# Patient Record
Sex: MALE | Race: WHITE | NOT HISPANIC OR LATINO | Employment: OTHER | RURAL
[De-identification: names, ages, dates, MRNs, and addresses within clinical notes are randomized per-mention and may not be internally consistent; named-entity substitution may affect disease eponyms.]

---

## 2022-04-19 ENCOUNTER — OFFICE VISIT (OUTPATIENT)
Dept: FAMILY MEDICINE CLINIC | Facility: CLINIC | Age: 67
End: 2022-04-19

## 2022-04-19 VITALS
HEIGHT: 66 IN | OXYGEN SATURATION: 96 % | SYSTOLIC BLOOD PRESSURE: 130 MMHG | BODY MASS INDEX: 32.78 KG/M2 | WEIGHT: 204 LBS | HEART RATE: 90 BPM | DIASTOLIC BLOOD PRESSURE: 84 MMHG

## 2022-04-19 DIAGNOSIS — M20.22 HALLUX RIGIDUS OF BOTH FEET: ICD-10-CM

## 2022-04-19 DIAGNOSIS — Z85.46 HISTORY OF PROSTATE CANCER: ICD-10-CM

## 2022-04-19 DIAGNOSIS — M19.041 PRIMARY OSTEOARTHRITIS OF HANDS, BILATERAL: ICD-10-CM

## 2022-04-19 DIAGNOSIS — R80.9 TYPE 2 DIABETES MELLITUS WITH MICROALBUMINURIA, WITHOUT LONG-TERM CURRENT USE OF INSULIN: ICD-10-CM

## 2022-04-19 DIAGNOSIS — F12.90 MARIJUANA USE, CONTINUOUS: ICD-10-CM

## 2022-04-19 DIAGNOSIS — Z11.59 ENCOUNTER FOR HEPATITIS C SCREENING TEST FOR LOW RISK PATIENT: ICD-10-CM

## 2022-04-19 DIAGNOSIS — M51.36 LUMBAR DEGENERATIVE DISC DISEASE: ICD-10-CM

## 2022-04-19 DIAGNOSIS — M19.042 PRIMARY OSTEOARTHRITIS OF HANDS, BILATERAL: ICD-10-CM

## 2022-04-19 DIAGNOSIS — Z12.5 PROSTATE CANCER SCREENING: ICD-10-CM

## 2022-04-19 DIAGNOSIS — E11.29 TYPE 2 DIABETES MELLITUS WITH MICROALBUMINURIA, WITHOUT LONG-TERM CURRENT USE OF INSULIN: ICD-10-CM

## 2022-04-19 DIAGNOSIS — I10 ESSENTIAL HYPERTENSION, BENIGN: ICD-10-CM

## 2022-04-19 DIAGNOSIS — E78.2 MIXED HYPERLIPIDEMIA: ICD-10-CM

## 2022-04-19 DIAGNOSIS — Z00.01 ENCOUNTER FOR GENERAL ADULT MEDICAL EXAMINATION WITH ABNORMAL FINDINGS: Primary | ICD-10-CM

## 2022-04-19 DIAGNOSIS — Z79.899 ENCOUNTER FOR LONG-TERM (CURRENT) USE OF OTHER MEDICATIONS: ICD-10-CM

## 2022-04-19 DIAGNOSIS — M20.21 HALLUX RIGIDUS OF BOTH FEET: ICD-10-CM

## 2022-04-19 DIAGNOSIS — Z12.11 COLON CANCER SCREENING: ICD-10-CM

## 2022-04-19 PROCEDURE — 36415 COLL VENOUS BLD VENIPUNCTURE: CPT | Performed by: FAMILY MEDICINE

## 2022-04-19 PROCEDURE — 99397 PER PM REEVAL EST PAT 65+ YR: CPT | Performed by: FAMILY MEDICINE

## 2022-04-19 RX ORDER — MAG HYDROX/ALUMINUM HYD/SIMETH 400-400-40
SUSPENSION, ORAL (FINAL DOSE FORM) ORAL
COMMUNITY

## 2022-04-19 RX ORDER — DULAGLUTIDE 0.75 MG/.5ML
INJECTION, SOLUTION SUBCUTANEOUS
COMMUNITY
End: 2022-04-19 | Stop reason: SDUPTHER

## 2022-04-19 RX ORDER — DULAGLUTIDE 0.75 MG/.5ML
0.5 INJECTION, SOLUTION SUBCUTANEOUS WEEKLY
Qty: 6 ML | Refills: 3 | Status: SHIPPED | OUTPATIENT
Start: 2022-04-19 | End: 2022-09-02 | Stop reason: SDUPTHER

## 2022-04-19 RX ORDER — ROSUVASTATIN CALCIUM 20 MG/1
20 TABLET, COATED ORAL DAILY
COMMUNITY
End: 2022-04-19 | Stop reason: SDUPTHER

## 2022-04-19 RX ORDER — LOSARTAN POTASSIUM 25 MG/1
25 TABLET ORAL DAILY
Qty: 90 TABLET | Refills: 3 | Status: SHIPPED | OUTPATIENT
Start: 2022-04-19 | End: 2022-09-02 | Stop reason: SDUPTHER

## 2022-04-19 RX ORDER — UBIDECARENONE 100 MG
100 CAPSULE ORAL DAILY
COMMUNITY

## 2022-04-19 RX ORDER — SODIUM PHOSPHATE,MONO-DIBASIC 19G-7G/118
ENEMA (ML) RECTAL
COMMUNITY

## 2022-04-19 RX ORDER — ROSUVASTATIN CALCIUM 20 MG/1
20 TABLET, COATED ORAL DAILY
Qty: 90 TABLET | Refills: 3 | Status: SHIPPED | OUTPATIENT
Start: 2022-04-19 | End: 2022-09-02 | Stop reason: SDUPTHER

## 2022-04-19 RX ORDER — PNEUMOCOCCAL VACCINE POLYVALENT 25; 25; 25; 25; 25; 25; 25; 25; 25; 25; 25; 25; 25; 25; 25; 25; 25; 25; 25; 25; 25; 25; 25 UG/.5ML; UG/.5ML; UG/.5ML; UG/.5ML; UG/.5ML; UG/.5ML; UG/.5ML; UG/.5ML; UG/.5ML; UG/.5ML; UG/.5ML; UG/.5ML; UG/.5ML; UG/.5ML; UG/.5ML; UG/.5ML; UG/.5ML; UG/.5ML; UG/.5ML; UG/.5ML; UG/.5ML; UG/.5ML; UG/.5ML
0.5 INJECTION, SOLUTION INTRAMUSCULAR; SUBCUTANEOUS ONCE
Qty: 0.5 ML | Refills: 0 | Status: SHIPPED | OUTPATIENT
Start: 2022-04-19 | End: 2022-04-19

## 2022-04-19 RX ORDER — LOSARTAN POTASSIUM 25 MG/1
25 TABLET ORAL DAILY
COMMUNITY
End: 2022-04-19 | Stop reason: SDUPTHER

## 2022-04-19 NOTE — PROGRESS NOTES
"Chief Complaint  Med Refill (Check up )    Subjective      BETH Mancilla presents to [facility]  History of Present Illness  Patient here for yearly exam and recheck on chronic issues.  He has not had any symptoms or recurrence of prostate cancer.  Patient does not check fingerstick blood sugars at home and refuses to do so.  His A1c has been okay over the last few years but he has decided on his own to only take the Trulicity every other week, and will not take it every week unless his A1c goes up.  He understands this is not the recommended dosing but this is how he wants to do it.  Patient still uses marijuana on a daily basis and has done so since age 14 and has no interest in quitting.  He says he is feeling well other than ongoing pain in the foot due to hallux rigidus, but has put off surgery because his wife's metastatic breast cancer has recurred.    Objective   Vital Signs:   Vitals:    04/19/22 0810   BP: 130/84   BP Location: Left arm   Patient Position: Sitting   Cuff Size: Large Adult   Pulse: 90   SpO2: 96%   Weight: 92.5 kg (204 lb)   Height: 167.6 cm (66\")      /84 (BP Location: Left arm, Patient Position: Sitting, Cuff Size: Large Adult)   Pulse 90   Ht 167.6 cm (66\")   Wt 92.5 kg (204 lb)   SpO2 96%   BMI 32.93 kg/m²     Body mass index is 32.93 kg/m².    Review of Systems   Constitutional: Negative for chills, fever and unexpected weight loss.   HENT: Negative for ear discharge, ear pain, mouth sores, nosebleeds, rhinorrhea, sinus pressure, sore throat, swollen glands and trouble swallowing.    Eyes: Negative for blurred vision, double vision, pain, redness and visual disturbance.   Respiratory: Negative for cough, shortness of breath and wheezing.    Cardiovascular: Negative for chest pain, palpitations and leg swelling.        PND, orthopnea   Gastrointestinal: Negative for abdominal distention, abdominal pain, anal bleeding, blood in stool, " constipation, diarrhea, nausea, vomiting and GERD.        Dysphagia, odynophagia   Endocrine: Negative for polydipsia, polyphagia and polyuria.   Genitourinary: Negative for difficulty urinating, dysuria, hematuria and urinary incontinence.   Musculoskeletal: Positive for arthralgias (unusual/atypica). Negative for gait problem, joint swelling, myalgias and neck stiffness.   Skin: Negative for rash, skin lesions (worrisome/suspicious) and bruise.   Allergic/Immunologic: Negative for food allergies.   Neurological: Negative for dizziness, tremors, seizures, syncope, weakness, numbness and headache.   Hematological: Negative for adenopathy. Does not bruise/bleed easily.   Psychiatric/Behavioral: Negative for suicidal ideas and depressed mood. The patient is not nervous/anxious.        Past History:  Medical History: has no past medical history on file.   Surgical History: has no past surgical history on file.   Family History: family history is not on file.   Social History: reports that he has been smoking cigarettes. He started smoking about 53 years ago. He has been smoking about 0.15 packs per day. He has never used smokeless tobacco. He reports current alcohol use. He reports that he does not use drugs.      Current Outpatient Medications:   •  coenzyme Q10 100 MG capsule, Take 100 mg by mouth Daily., Disp: , Rfl:   •  Dulaglutide (Trulicity) 0.75 MG/0.5ML solution pen-injector, Inject 0.75 mg under the skin into the appropriate area as directed 1 (One) Time Per Week., Disp: 6 mL, Rfl: 3  •  glucosamine-chondroitin 500-400 MG capsule capsule, Take  by mouth 3 (Three) Times a Day With Meals., Disp: , Rfl:   •  losartan (COZAAR) 25 MG tablet, Take 1 tablet by mouth Daily., Disp: 90 tablet, Rfl: 3  •  Omega-3 Krill Oil 300 MG capsule, Take  by mouth., Disp: , Rfl:   •  rosuvastatin (CRESTOR) 20 MG tablet, Take 1 tablet by mouth Daily., Disp: 90 tablet, Rfl: 3  •  pneumococcal polysaccharide 23-valent (Pneumovax 23)  25 MCG/0.5ML vaccine, Inject 0.5 mL into the appropriate muscle as directed by prescriber 1 (One) Time for 1 dose., Disp: 0.5 mL, Rfl: 0    Allergies: Patient has no known allergies.    Physical Exam  Constitutional:       General: He is not in acute distress.     Appearance: He is obese. He is not ill-appearing.   HENT:      Head: Normocephalic and atraumatic.      Right Ear: Ear canal and external ear normal.      Left Ear: Ear canal and external ear normal.      Nose: Nose normal. No rhinorrhea.      Mouth/Throat:      Mouth: Mucous membranes are moist.      Pharynx: Oropharynx is clear. No posterior oropharyngeal erythema.   Eyes:      General: No scleral icterus.     Extraocular Movements: Extraocular movements intact.      Conjunctiva/sclera: Conjunctivae normal.      Pupils: Pupils are equal, round, and reactive to light.   Neck:      Vascular: No carotid bruit.   Cardiovascular:      Rate and Rhythm: Normal rate and regular rhythm.      Pulses: Normal pulses.      Heart sounds: Normal heart sounds. No murmur heard.    No gallop.   Pulmonary:      Effort: Pulmonary effort is normal.      Breath sounds: Normal breath sounds. No wheezing or rhonchi.   Chest:      Chest wall: No tenderness.   Abdominal:      General: Bowel sounds are normal. There is no distension.      Palpations: Abdomen is soft. There is no mass.      Tenderness: There is no abdominal tenderness. There is no guarding or rebound.   Musculoskeletal:         General: No swelling, tenderness or deformity. Normal range of motion.      Cervical back: No rigidity.      Right lower leg: No edema.      Left lower leg: No edema.   Feet:      Right foot:      Protective Sensation: 5 sites tested.      Left foot:      Protective Sensation: 5 sites tested.      Comments:      Lymphadenopathy:      Cervical: No cervical adenopathy.   Skin:     General: Skin is warm and dry.      Capillary Refill: Capillary refill takes less than 2 seconds.      Coloration:  Skin is not pale.      Findings: No erythema or rash.   Neurological:      General: No focal deficit present.      Mental Status: He is alert and oriented to person, place, and time.      Cranial Nerves: No cranial nerve deficit.      Motor: No weakness.      Coordination: Coordination normal.      Gait: Gait normal.   Psychiatric:         Mood and Affect: Mood normal.         Behavior: Behavior normal.         Thought Content: Thought content normal.         Judgment: Judgment normal.          Result Review :                  Assessment and Plan   Diagnoses and all orders for this visit:    1. Encounter for general adult medical examination with abnormal findings (Primary)  Preventive health measures and healthy lifestyle in general were discussed at length.  The patient will have labs drawn.  We will refill medications.  I have explained the risk of using marijuana daily but the patient has no interest in quitting.  He was given a Pneumovax today.  If all goes well I will see him back in 6 months or sooner as needed  2. Type 2 diabetes mellitus with microalbuminuria, without long-term current use of insulin (HCC)  -     Hemoglobin A1c; Future    3. Mixed hyperlipidemia  -     Lipid Panel; Future    4. Prostate cancer screening  -     PSA Screen; Future    5. History of prostate cancer    6. Encounter for long-term (current) use of other medications  -     CBC Auto Differential; Future  -     Comprehensive Metabolic Panel; Future    7. Essential hypertension, benign    8. Primary osteoarthritis of hands, bilateral    9. Lumbar degenerative disc disease    10. Encounter for hepatitis C screening test for low risk patient  -     Hepatitis C Antibody; Future    11. Colon cancer screening  Patient had Cologuard test April 2020, so this will be due again in 1 year  Other orders  -     pneumococcal polysaccharide 23-valent (Pneumovax 23) 25 MCG/0.5ML vaccine; Inject 0.5 mL into the appropriate muscle as directed by  prescriber 1 (One) Time for 1 dose.  Dispense: 0.5 mL; Refill: 0  -     Dulaglutide (Trulicity) 0.75 MG/0.5ML solution pen-injector; Inject 0.75 mg under the skin into the appropriate area as directed 1 (One) Time Per Week.  Dispense: 6 mL; Refill: 3  -     losartan (COZAAR) 25 MG tablet; Take 1 tablet by mouth Daily.  Dispense: 90 tablet; Refill: 3  -     rosuvastatin (CRESTOR) 20 MG tablet; Take 1 tablet by mouth Daily.  Dispense: 90 tablet; Refill: 3                  Follow Up   Return in about 6 months (around 10/19/2022) for Recheck.  Patient was given instructions and counseling regarding his condition or for health maintenance advice. Please see specific information pulled into the AVS if appropriate.     Neo Vaca MD

## 2022-04-20 LAB
ALBUMIN SERPL-MCNC: 4.9 G/DL (ref 3.8–4.8)
ALBUMIN/GLOB SERPL: 1.8 {RATIO} (ref 1.2–2.2)
ALP SERPL-CCNC: 103 IU/L (ref 44–121)
ALT SERPL-CCNC: 26 IU/L (ref 0–44)
AST SERPL-CCNC: 17 IU/L (ref 0–40)
BASOPHILS # BLD AUTO: 0.1 X10E3/UL (ref 0–0.2)
BASOPHILS NFR BLD AUTO: 1 %
BILIRUB SERPL-MCNC: 0.8 MG/DL (ref 0–1.2)
BUN SERPL-MCNC: 12 MG/DL (ref 8–27)
BUN/CREAT SERPL: 11 (ref 10–24)
CALCIUM SERPL-MCNC: 9.9 MG/DL (ref 8.6–10.2)
CHLORIDE SERPL-SCNC: 101 MMOL/L (ref 96–106)
CHOLEST SERPL-MCNC: 160 MG/DL (ref 100–199)
CO2 SERPL-SCNC: 21 MMOL/L (ref 20–29)
CREAT SERPL-MCNC: 1.05 MG/DL (ref 0.76–1.27)
EGFRCR SERPLBLD CKD-EPI 2021: 79 ML/MIN/1.73
EOSINOPHIL # BLD AUTO: 0.2 X10E3/UL (ref 0–0.4)
EOSINOPHIL NFR BLD AUTO: 3 %
ERYTHROCYTE [DISTWIDTH] IN BLOOD BY AUTOMATED COUNT: 12.8 % (ref 11.6–15.4)
GLOBULIN SER CALC-MCNC: 2.7 G/DL (ref 1.5–4.5)
GLUCOSE SERPL-MCNC: 158 MG/DL (ref 65–99)
HBA1C MFR BLD: 7 % (ref 4.8–5.6)
HCT VFR BLD AUTO: 52.2 % (ref 37.5–51)
HCV AB S/CO SERPL IA: 0.1 S/CO RATIO (ref 0–0.9)
HDLC SERPL-MCNC: 36 MG/DL
HGB BLD-MCNC: 17.6 G/DL (ref 13–17.7)
IMM GRANULOCYTES # BLD AUTO: 0 X10E3/UL (ref 0–0.1)
IMM GRANULOCYTES NFR BLD AUTO: 0 %
LDLC SERPL CALC-MCNC: 105 MG/DL (ref 0–99)
LYMPHOCYTES # BLD AUTO: 1.8 X10E3/UL (ref 0.7–3.1)
LYMPHOCYTES NFR BLD AUTO: 29 %
MCH RBC QN AUTO: 29.6 PG (ref 26.6–33)
MCHC RBC AUTO-ENTMCNC: 33.7 G/DL (ref 31.5–35.7)
MCV RBC AUTO: 88 FL (ref 79–97)
MONOCYTES # BLD AUTO: 0.7 X10E3/UL (ref 0.1–0.9)
MONOCYTES NFR BLD AUTO: 11 %
NEUTROPHILS # BLD AUTO: 3.5 X10E3/UL (ref 1.4–7)
NEUTROPHILS NFR BLD AUTO: 56 %
PLATELET # BLD AUTO: 262 X10E3/UL (ref 150–450)
POTASSIUM SERPL-SCNC: 4.6 MMOL/L (ref 3.5–5.2)
PROT SERPL-MCNC: 7.6 G/DL (ref 6–8.5)
PSA SERPL-MCNC: <0.1 NG/ML (ref 0–4)
RBC # BLD AUTO: 5.94 X10E6/UL (ref 4.14–5.8)
SODIUM SERPL-SCNC: 140 MMOL/L (ref 134–144)
TRIGL SERPL-MCNC: 105 MG/DL (ref 0–149)
VLDLC SERPL CALC-MCNC: 19 MG/DL (ref 5–40)
WBC # BLD AUTO: 6.2 X10E3/UL (ref 3.4–10.8)

## 2022-09-01 ENCOUNTER — TELEPHONE (OUTPATIENT)
Dept: FAMILY MEDICINE CLINIC | Facility: CLINIC | Age: 67
End: 2022-09-01

## 2022-09-01 NOTE — TELEPHONE ENCOUNTER
Incoming Refill Request      Medication requested (name and dose): LOSARTAN POTASSIUM 25 MG  ROSUVASTATIN CALCIUM 20 MG  IBUPROFEN 800 MG  TRULICITY 0.75MG/0.5ML    Pharmacy where request should be sent: [facility]    Additional details provided by patient: PT WILL RUN OUT BEFORE HIS APPOINTMENT WITH DR FONTAINE. AS ABOUT A WEEK OR SO LEFT OF THESE LEFT.    Best call back number: [phone]    Does the patient have less than a 3 day supply:  [] Yes  [x] No    Carlos A Gibson Rep  09/01/22, 12:58 EDT

## 2022-09-02 RX ORDER — IBUPROFEN 800 MG/1
800 TABLET ORAL EVERY 6 HOURS PRN
Qty: 180 TABLET | Refills: 0 | Status: SHIPPED | OUTPATIENT
Start: 2022-09-02 | End: 2022-09-12 | Stop reason: SDUPTHER

## 2022-09-02 RX ORDER — DULAGLUTIDE 0.75 MG/.5ML
0.5 INJECTION, SOLUTION SUBCUTANEOUS WEEKLY
Qty: 6 ML | Refills: 0 | Status: SHIPPED | OUTPATIENT
Start: 2022-09-02 | End: 2022-09-12 | Stop reason: SDUPTHER

## 2022-09-02 RX ORDER — LOSARTAN POTASSIUM 25 MG/1
25 TABLET ORAL DAILY
Qty: 90 TABLET | Refills: 0 | Status: SHIPPED | OUTPATIENT
Start: 2022-09-02 | End: 2022-09-12 | Stop reason: SDUPTHER

## 2022-09-02 RX ORDER — IBUPROFEN 800 MG/1
800 TABLET ORAL 3 TIMES DAILY PRN
COMMUNITY
End: 2022-09-02 | Stop reason: SDUPTHER

## 2022-09-02 RX ORDER — ROSUVASTATIN CALCIUM 20 MG/1
20 TABLET, COATED ORAL DAILY
Qty: 90 TABLET | Refills: 0 | Status: SHIPPED | OUTPATIENT
Start: 2022-09-02 | End: 2022-09-12 | Stop reason: SDUPTHER

## 2022-09-02 NOTE — TELEPHONE ENCOUNTER
I sent a 90 day refill on all but the ibuprofen, as it is not on his medicine list. Someone will have to verify that one, maybe look in [facility], maybe he only takes prn? If so, then it is OK to send in one refill

## 2022-09-09 ENCOUNTER — TELEPHONE (OUTPATIENT)
Dept: FAMILY MEDICINE CLINIC | Facility: CLINIC | Age: 67
End: 2022-09-09

## 2022-09-09 NOTE — TELEPHONE ENCOUNTER
Caller: BETH Mancilla    Relationship: Self    Best call back number:  [phone]    Requested Prescriptions:   Requested Prescriptions      No prescriptions requested or ordered in this encounter        losartan (COZAAR) 25 MG tablet    roSuvastatin (CRESTOR) 20 MG tablet    ibuprofen (ADVIL,MOTRIN) 800 MG tablet    TRULICITY .0.75MG/0.5ML    Pharmacy where request should be sent:      [facility] [address] [phone] [facility] [phone] FX    Additional details provided by patient:     THIS WAS REQUESTED INITIALLY ON 9/1/22 AND [facility] STILL DOES NOT HAVE THE MEDICATIONS         Carlos A Corrigan Rep   09/09/22 14:53 EDT

## 2022-09-12 RX ORDER — DULAGLUTIDE 0.75 MG/.5ML
0.5 INJECTION, SOLUTION SUBCUTANEOUS WEEKLY
Qty: 6 ML | Refills: 0 | Status: SHIPPED | OUTPATIENT
Start: 2022-09-12 | End: 2022-10-18 | Stop reason: SDUPTHER

## 2022-09-12 RX ORDER — ROSUVASTATIN CALCIUM 20 MG/1
20 TABLET, COATED ORAL DAILY
Qty: 90 TABLET | Refills: 0 | Status: SHIPPED | OUTPATIENT
Start: 2022-09-12 | End: 2022-10-18 | Stop reason: SDUPTHER

## 2022-09-12 RX ORDER — LOSARTAN POTASSIUM 25 MG/1
25 TABLET ORAL DAILY
Qty: 90 TABLET | Refills: 0 | Status: SHIPPED | OUTPATIENT
Start: 2022-09-12 | End: 2022-10-18 | Stop reason: SDUPTHER

## 2022-09-12 RX ORDER — IBUPROFEN 800 MG/1
800 TABLET ORAL EVERY 6 HOURS PRN
Qty: 180 TABLET | Refills: 0 | Status: SHIPPED | OUTPATIENT
Start: 2022-09-12

## 2022-09-16 ENCOUNTER — TELEPHONE (OUTPATIENT)
Dept: FAMILY MEDICINE CLINIC | Facility: CLINIC | Age: 67
End: 2022-09-16

## 2022-09-16 NOTE — TELEPHONE ENCOUNTER
HUB TO READ    Left VM to return call. Patient's medication was confirmed by Emily 09/12/22. Please contact your pharmacy.

## 2022-09-16 NOTE — TELEPHONE ENCOUNTER
Caller: BETH Mancilla    Relationship to patient: Self    Best call back number: [phone]    PATIENT HAS STILL NOT RECEIVED HIS REFILLS FROM [facility]

## 2022-10-18 ENCOUNTER — OFFICE VISIT (OUTPATIENT)
Dept: FAMILY MEDICINE CLINIC | Facility: CLINIC | Age: 67
End: 2022-10-18

## 2022-10-18 VITALS
DIASTOLIC BLOOD PRESSURE: 84 MMHG | BODY MASS INDEX: 32.98 KG/M2 | WEIGHT: 205.2 LBS | OXYGEN SATURATION: 97 % | HEIGHT: 66 IN | SYSTOLIC BLOOD PRESSURE: 136 MMHG | RESPIRATION RATE: 20 BRPM | HEART RATE: 80 BPM

## 2022-10-18 DIAGNOSIS — M20.22 HALLUX RIGIDUS OF BOTH FEET: ICD-10-CM

## 2022-10-18 DIAGNOSIS — R06.02 SHORTNESS OF BREATH: ICD-10-CM

## 2022-10-18 DIAGNOSIS — R07.9 CHEST PAIN, UNSPECIFIED TYPE: ICD-10-CM

## 2022-10-18 DIAGNOSIS — M19.041 PRIMARY OSTEOARTHRITIS OF HANDS, BILATERAL: ICD-10-CM

## 2022-10-18 DIAGNOSIS — M19.042 PRIMARY OSTEOARTHRITIS OF HANDS, BILATERAL: ICD-10-CM

## 2022-10-18 DIAGNOSIS — E11.29 TYPE 2 DIABETES MELLITUS WITH MICROALBUMINURIA, WITHOUT LONG-TERM CURRENT USE OF INSULIN: Primary | ICD-10-CM

## 2022-10-18 DIAGNOSIS — Z23 NEED FOR IMMUNIZATION AGAINST INFLUENZA: ICD-10-CM

## 2022-10-18 DIAGNOSIS — E78.2 MIXED HYPERLIPIDEMIA: ICD-10-CM

## 2022-10-18 DIAGNOSIS — R80.9 TYPE 2 DIABETES MELLITUS WITH MICROALBUMINURIA, WITHOUT LONG-TERM CURRENT USE OF INSULIN: Primary | ICD-10-CM

## 2022-10-18 DIAGNOSIS — I10 ESSENTIAL HYPERTENSION, BENIGN: ICD-10-CM

## 2022-10-18 DIAGNOSIS — Z79.899 ENCOUNTER FOR LONG-TERM (CURRENT) USE OF OTHER MEDICATIONS: ICD-10-CM

## 2022-10-18 DIAGNOSIS — M20.21 HALLUX RIGIDUS OF BOTH FEET: ICD-10-CM

## 2022-10-18 DIAGNOSIS — M51.36 LUMBAR DEGENERATIVE DISC DISEASE: ICD-10-CM

## 2022-10-18 DIAGNOSIS — Z85.46 HISTORY OF PROSTATE CANCER: ICD-10-CM

## 2022-10-18 PROCEDURE — 36415 COLL VENOUS BLD VENIPUNCTURE: CPT | Performed by: FAMILY MEDICINE

## 2022-10-18 PROCEDURE — 90662 IIV NO PRSV INCREASED AG IM: CPT | Performed by: FAMILY MEDICINE

## 2022-10-18 PROCEDURE — 99213 OFFICE O/P EST LOW 20 MIN: CPT | Performed by: FAMILY MEDICINE

## 2022-10-18 PROCEDURE — G0008 ADMIN INFLUENZA VIRUS VAC: HCPCS | Performed by: FAMILY MEDICINE

## 2022-10-18 RX ORDER — LOSARTAN POTASSIUM 25 MG/1
25 TABLET ORAL DAILY
Qty: 90 TABLET | Refills: 2 | Status: SHIPPED | OUTPATIENT
Start: 2022-10-18

## 2022-10-18 RX ORDER — ROSUVASTATIN CALCIUM 20 MG/1
20 TABLET, COATED ORAL DAILY
Qty: 90 TABLET | Refills: 3 | Status: SHIPPED | OUTPATIENT
Start: 2022-10-18 | End: 2022-10-20 | Stop reason: SDUPTHER

## 2022-10-18 RX ORDER — DULAGLUTIDE 0.75 MG/.5ML
0.5 INJECTION, SOLUTION SUBCUTANEOUS WEEKLY
Qty: 6 ML | Refills: 3 | Status: SHIPPED | OUTPATIENT
Start: 2022-10-18

## 2022-10-19 ENCOUNTER — TELEPHONE (OUTPATIENT)
Dept: FAMILY MEDICINE CLINIC | Facility: CLINIC | Age: 67
End: 2022-10-19

## 2022-10-19 LAB
ALBUMIN SERPL-MCNC: 4.5 G/DL (ref 3.8–4.8)
ALBUMIN/GLOB SERPL: 1.7 {RATIO} (ref 1.2–2.2)
ALP SERPL-CCNC: 91 IU/L (ref 44–121)
ALT SERPL-CCNC: 25 IU/L (ref 0–44)
AST SERPL-CCNC: 19 IU/L (ref 0–40)
BASOPHILS # BLD AUTO: 0.1 X10E3/UL (ref 0–0.2)
BASOPHILS NFR BLD AUTO: 1 %
BILIRUB SERPL-MCNC: 0.9 MG/DL (ref 0–1.2)
BUN SERPL-MCNC: 12 MG/DL (ref 8–27)
BUN/CREAT SERPL: 12 (ref 10–24)
CALCIUM SERPL-MCNC: 9.4 MG/DL (ref 8.6–10.2)
CHLORIDE SERPL-SCNC: 104 MMOL/L (ref 96–106)
CHOLEST SERPL-MCNC: 145 MG/DL (ref 100–199)
CO2 SERPL-SCNC: 23 MMOL/L (ref 20–29)
CREAT SERPL-MCNC: 0.98 MG/DL (ref 0.76–1.27)
EGFRCR SERPLBLD CKD-EPI 2021: 85 ML/MIN/1.73
EOSINOPHIL # BLD AUTO: 0.2 X10E3/UL (ref 0–0.4)
EOSINOPHIL NFR BLD AUTO: 3 %
ERYTHROCYTE [DISTWIDTH] IN BLOOD BY AUTOMATED COUNT: 13 % (ref 11.6–15.4)
GLOBULIN SER CALC-MCNC: 2.6 G/DL (ref 1.5–4.5)
GLUCOSE SERPL-MCNC: 165 MG/DL (ref 70–99)
HBA1C MFR BLD: 6.7 % (ref 4.8–5.6)
HCT VFR BLD AUTO: 49.8 % (ref 37.5–51)
HDLC SERPL-MCNC: 35 MG/DL
HGB BLD-MCNC: 16.6 G/DL (ref 13–17.7)
IMM GRANULOCYTES # BLD AUTO: 0 X10E3/UL (ref 0–0.1)
IMM GRANULOCYTES NFR BLD AUTO: 0 %
LDLC SERPL CALC-MCNC: 83 MG/DL (ref 0–99)
LYMPHOCYTES # BLD AUTO: 2.2 X10E3/UL (ref 0.7–3.1)
LYMPHOCYTES NFR BLD AUTO: 30 %
MCH RBC QN AUTO: 29.9 PG (ref 26.6–33)
MCHC RBC AUTO-ENTMCNC: 33.3 G/DL (ref 31.5–35.7)
MCV RBC AUTO: 90 FL (ref 79–97)
MONOCYTES # BLD AUTO: 0.8 X10E3/UL (ref 0.1–0.9)
MONOCYTES NFR BLD AUTO: 11 %
NEUTROPHILS # BLD AUTO: 4 X10E3/UL (ref 1.4–7)
NEUTROPHILS NFR BLD AUTO: 55 %
PLATELET # BLD AUTO: 242 X10E3/UL (ref 150–450)
POTASSIUM SERPL-SCNC: 4.7 MMOL/L (ref 3.5–5.2)
PROT SERPL-MCNC: 7.1 G/DL (ref 6–8.5)
RBC # BLD AUTO: 5.56 X10E6/UL (ref 4.14–5.8)
SODIUM SERPL-SCNC: 141 MMOL/L (ref 134–144)
TRIGL SERPL-MCNC: 157 MG/DL (ref 0–149)
VLDLC SERPL CALC-MCNC: 27 MG/DL (ref 5–40)
WBC # BLD AUTO: 7.2 X10E3/UL (ref 3.4–10.8)

## 2022-10-20 ENCOUNTER — OFFICE VISIT (OUTPATIENT)
Dept: CARDIOLOGY | Facility: CLINIC | Age: 67
End: 2022-10-20

## 2022-10-20 VITALS
BODY MASS INDEX: 32.62 KG/M2 | OXYGEN SATURATION: 98 % | RESPIRATION RATE: 18 BRPM | SYSTOLIC BLOOD PRESSURE: 102 MMHG | DIASTOLIC BLOOD PRESSURE: 66 MMHG | TEMPERATURE: 97.8 F | HEIGHT: 66 IN | WEIGHT: 203 LBS | HEART RATE: 96 BPM

## 2022-10-20 DIAGNOSIS — R80.9 TYPE 2 DIABETES MELLITUS WITH MICROALBUMINURIA, WITHOUT LONG-TERM CURRENT USE OF INSULIN: ICD-10-CM

## 2022-10-20 DIAGNOSIS — I10 ESSENTIAL HYPERTENSION, BENIGN: ICD-10-CM

## 2022-10-20 DIAGNOSIS — R07.89 CHEST PAIN, ATYPICAL: Primary | ICD-10-CM

## 2022-10-20 DIAGNOSIS — E78.2 MIXED HYPERLIPIDEMIA: ICD-10-CM

## 2022-10-20 DIAGNOSIS — E11.29 TYPE 2 DIABETES MELLITUS WITH MICROALBUMINURIA, WITHOUT LONG-TERM CURRENT USE OF INSULIN: ICD-10-CM

## 2022-10-20 DIAGNOSIS — Z72.0 TOBACCO USE: ICD-10-CM

## 2022-10-20 DIAGNOSIS — Z23 NEED FOR IMMUNIZATION AGAINST INFLUENZA: ICD-10-CM

## 2022-10-20 DIAGNOSIS — R06.09 EXERTIONAL DYSPNEA: ICD-10-CM

## 2022-10-20 PROCEDURE — 93000 ELECTROCARDIOGRAM COMPLETE: CPT

## 2022-10-20 PROCEDURE — 99204 OFFICE O/P NEW MOD 45 MIN: CPT

## 2022-10-20 RX ORDER — ROSUVASTATIN CALCIUM 40 MG/1
40 TABLET, COATED ORAL NIGHTLY
Qty: 90 TABLET | Refills: 2 | Status: SHIPPED | OUTPATIENT
Start: 2022-10-20

## 2022-10-20 NOTE — PROGRESS NOTES
"[facility]  [facility]  [address] [place] [address]  Dept: [phone]  Dept Fax: [phone]    BETH Mancilla  1955    New Patient Office Note    History of Present Illness:  BETH Mancilla is a 67 y.o. male who presents to the clinic for Establish Care, Chest Pain, Shortness of Breath, and Edema. He is retired , , 3 kids. Smokes very little cigarettes 1-2/day 40 yr hx, also daily marijuana use, denies alcohol. Caffeine 3/day. Negative family history. Regular diet, denies exercise. He presents today as referral from PCP for complaints for 2 yr history increasing shortness of breath, occurring with activity such as walking up hill, feels like he needs to rest, also feels he can't do the things he did before. Also had episode of left \"sharp\" chest pain, a few days ago which occurred while he was moving a treadmill, associated with SOB, denies diaphoresis, N/V, he had to rest and then it resolved after several minutes. He denies additional complaints today, no palpitations, LE edema, orthopnea, PND. Never had any formal cardiac workup. HTN on Losartan 25 mg qd, BP today 128/80 right arm and 128/80 left arm, also he is DM (8 yrs), HLP on Crestor 20 mg qd, LDL 83, goal < 70. PE today seems normal, vitals unremarkable, Ekg SR, Hr 86 bpm, low voltage, poor R wave. Recent labs look good. Will increase Crestor to 40 q hs to reach goal <70, Nuclear, Echo, Cxr for further evaluation of symptoms: CP, SOB. Advised to stop smoking, dietary management with nutritional guidance and recommendations given.     The following portions of the patient's history were reviewed and updated as appropriate: allergies, current medications, past family history, past medical history, past social history, past surgical history, and problem list.    Medications:  coenzyme Q10  glucosamine-chondroitin capsule  ibuprofen  losartan  Omega-3 Krill Oil " "capsule  rosuvastatin  Trulicity solution pen-injector    Subjective  Allergies   Allergen Reactions   • Metformin Diarrhea        History reviewed. No pertinent past medical history.    History reviewed. No pertinent surgical history.    History reviewed. No pertinent family history.     Social History     Socioeconomic History   • Marital status:    Tobacco Use   • Smoking status: Some Days     Packs/day: 0.15     Types: Cigarettes     Start date: 1969   • Smokeless tobacco: Never   Vaping Use   • Vaping Use: Never used   Substance and Sexual Activity   • Alcohol use: Yes     Comment: OCC   • Drug use: Yes     Comment: Smokes Marijuana   • Sexual activity: Defer       Review of Systems   Respiratory: Positive for shortness of breath.    Cardiovascular: Positive for chest pain.   All other systems reviewed and are negative.      Cardiovascular Procedures    ECHO/MUGA:   STRESS TESTS:   CARDIAC CATH:   DEVICES:   HOLTER:   CT/MRI:   VASCULAR:   CARDIOTHORACIC:     Objective  Vitals:    10/20/22 1431   BP: 102/66   BP Location: Right arm   Patient Position: Sitting   Cuff Size: Adult   Pulse: 96   Resp: 18   Temp: 97.8 °F (36.6 °C)   TempSrc: Infrared   SpO2: 98%   Weight: 92.1 kg (203 lb)   Height: 167.6 cm (66\")   PainSc: 0-No pain       Physical Exam  Constitutional:       Appearance: Healthy appearance. Not in distress.   Neck:      Vascular: No JVR. JVD normal.   Pulmonary:      Effort: Pulmonary effort is normal.      Breath sounds: Normal breath sounds. No wheezing. No rhonchi. No rales.   Chest:      Chest wall: Not tender to palpatation.   Cardiovascular:      PMI at left midclavicular line. Normal rate. Regular rhythm. Normal S1. Normal S2.      Murmurs: There is no murmur.      No gallop. No click. No rub.   Pulses:     Intact distal pulses.   Edema:     Pretibial: bilateral trace edema of the pretibial area.     Ankle: bilateral trace edema of the ankle.  Abdominal:      General: Bowel sounds are " normal.      Palpations: Abdomen is soft.      Tenderness: There is no abdominal tenderness.   Musculoskeletal: Normal range of motion.         General: No tenderness. Skin:     General: Skin is warm and dry.   Neurological:      General: No focal deficit present.      Mental Status: Alert and oriented to person, place and time.          Diagnostic Data    ECG 12 Lead    Date/Time: 10/20/2022 3:49 PM  Performed by: Katelin Lopez APRN  Authorized by: Katelin Lopez APRN   Comparison: not compared with previous ECG   Previous ECG: no previous ECG available  Rhythm: sinus rhythm  Rate: normal  BPM: 86  QRS axis: normal  Other findings: low voltage and poor R wave progression    Clinical impression: non-specific ECG            Assessment and Plan  Diagnoses and all orders for this visit:    1. Chest pain, atypical (Primary)  As above, seems somewhat atypical of IHD, he does have some risk factors, HTN, HLP, DM, smoker. Will get Nuclear. He should be ok to walk treadmill. Echo and CXR.   -     Adult Transthoracic Echo Complete W/ Cont if Necessary Per Protocol; Future  -     XR Chest PA & Lateral; Future  -     Stress Test With Myocardial Perfusion One Day; Future  -     ECG 12 Lead    2. Exertional dyspnea  2 yr history, seems worse now, exertional only. CXR, Echo, Nuclear. Advised to quit smoking. + snores, cannot exclude MARCELLO.   -     Adult Transthoracic Echo Complete W/ Cont if Necessary Per Protocol; Future  -     XR Chest PA & Lateral; Future  -     Stress Test With Myocardial Perfusion One Day; Future    3. Essential hypertension, benign  On Losartan 25 mg qd, /80 on recheck. Continue current management.     4. Mixed hyperlipidemia  On Crestor 20 mg qd, LDL 83, goal <70 for DM. Will increase to 40 mg qd. Dietary guidance given.   -     rosuvastatin (CRESTOR) 40 MG tablet; Take 1 tablet by mouth Every Night.  Dispense: 90 tablet; Refill: 2    5. Type 2 diabetes mellitus with  microalbuminuria, without long-term current use of insulin (HCC)  A1C 6.7. Managed by PCP.     6. Tobacco use  1-2 cigarettes daily x 40 yrs/daily marijuana. Advised to quit.         Return in about 3 weeks (around 11/10/2022) for Recheck.    Katelin Lopez, APRN  10/20/2022

## 2022-10-20 NOTE — TELEPHONE ENCOUNTER
I sent referral for cardiology consultation yesterday.  I know its only been a day and a half since I sent the request, but this patient is generally not a complainer, but he is a 67-year-old male with diabetes hyperlipidemia and hypertension having chest pain and new onset shortness of breath with exertion, so we need to get him seen pretty soon.  Thanks

## 2022-10-20 NOTE — PROGRESS NOTES
"Chief Complaint  Diabetes and Shortness of Breath    Subjective      BETH Mancilla presents to [facility]  History of Present Illness  Patient is here for 6-month checkup on diabetes.  He states that his diabetes continues to be well controlled as far as he knows, but does not check his glucose as often as he used to.  He says he will be having surgery on his right foot at the end of December.  The foot problem has made it difficult for him to walk as much as he used to, so he has not been as active as usual, and has put on a little weight.  He states that over the last couple of weeks he notices that he gets a bit more short of breath with exertion.  He has not had any shortness of breath at rest, no PND orthopnea.  When he has a shortness of breath with exertion he will have a little bit of discomfort in the left lateral chest area, but it stops very shortly after he rests.  The pain does not radiate.  There is been no injury.  No diaphoresis or presyncope.  Denies any palpitations.  Objective   Vital Signs:   Vitals:    10/18/22 0800 10/18/22 0855   BP: 134/80 136/84   Pulse: 80    Resp: 20    SpO2: 97%    Weight: 93.1 kg (205 lb 3.2 oz)    Height: 167.6 cm (66\")       /84   Pulse 80   Resp 20   Ht 167.6 cm (66\")   Wt 93.1 kg (205 lb 3.2 oz)   SpO2 97%   BMI 33.12 kg/m²     Body mass index is 33.12 kg/m².    Review of Systems   Constitutional: Negative for chills, diaphoresis, fever and unexpected weight loss.   HENT: Negative for ear discharge, ear pain, mouth sores, nosebleeds, rhinorrhea, sinus pressure, sore throat, swollen glands and trouble swallowing.    Eyes: Negative for blurred vision, double vision, pain, redness and visual disturbance.   Respiratory: Positive for shortness of breath. Negative for cough, chest tightness and wheezing.    Cardiovascular: Positive for chest pain. Negative for palpitations and leg swelling.        PND, orthopnea "   Gastrointestinal: Negative for abdominal distention, abdominal pain, anal bleeding, blood in stool, constipation, diarrhea, nausea, vomiting and GERD.        Dysphagia, odynophagia   Endocrine: Negative for polydipsia, polyphagia and polyuria.   Genitourinary: Negative for dysuria, frequency, hematuria, urgency and urinary incontinence.   Musculoskeletal: Positive for gait problem (From foot pain). Negative for arthralgias (unusual/atypica), back pain, joint swelling, myalgias and neck pain.   Skin: Negative for rash, skin lesions (worrisome/suspicious), wound and bruise.   Allergic/Immunologic: Negative for food allergies.   Neurological: Negative for dizziness, tremors, seizures, syncope, speech difficulty, weakness, light-headedness, numbness and headache.   Hematological: Negative for adenopathy. Does not bruise/bleed easily.   Psychiatric/Behavioral: Negative for suicidal ideas and depressed mood. The patient is not nervous/anxious.        Past History:  Medical History: has no past medical history on file.   Surgical History: has no past surgical history on file.   Family History: family history is not on file.   Social History: reports that he has been smoking cigarettes. He started smoking about 53 years ago. He has been smoking an average of .15 packs per day. He has never used smokeless tobacco. He reports current alcohol use. He reports that he does not use drugs.      Current Outpatient Medications:   •  coenzyme Q10 100 MG capsule, Take 100 mg by mouth Daily., Disp: , Rfl:   •  Dulaglutide (Trulicity) 0.75 MG/0.5ML solution pen-injector, Inject 0.75 mg under the skin into the appropriate area as directed 1 (One) Time Per Week., Disp: 6 mL, Rfl: 3  •  glucosamine-chondroitin 500-400 MG capsule capsule, Take  by mouth 3 (Three) Times a Day With Meals., Disp: , Rfl:   •  losartan (COZAAR) 25 MG tablet, Take 1 tablet by mouth Daily., Disp: 90 tablet, Rfl: 2  •  Omega-3 Krill Oil 300 MG capsule, Take  by  mouth., Disp: , Rfl:   •  rosuvastatin (CRESTOR) 20 MG tablet, Take 1 tablet by mouth Daily., Disp: 90 tablet, Rfl: 3  •  ibuprofen (ADVIL,MOTRIN) 800 MG tablet, Take 1 tablet by mouth Every 6 (Six) Hours As Needed (As needed)., Disp: 180 tablet, Rfl: 0    Allergies: Metformin    Physical Exam  Constitutional:       General: He is not in acute distress.     Appearance: He is obese. He is not toxic-appearing.   HENT:      Head: Normocephalic and atraumatic.      Right Ear: Ear canal and external ear normal.      Left Ear: Ear canal and external ear normal.      Nose: Nose normal.      Mouth/Throat:      Mouth: Mucous membranes are moist.      Pharynx: Oropharynx is clear.   Eyes:      General: No scleral icterus.     Extraocular Movements: Extraocular movements intact.      Conjunctiva/sclera: Conjunctivae normal.      Pupils: Pupils are equal, round, and reactive to light.   Neck:      Vascular: No carotid bruit.   Cardiovascular:      Rate and Rhythm: Normal rate and regular rhythm.      Pulses: Normal pulses.      Heart sounds: Normal heart sounds. No murmur heard.    No gallop.   Pulmonary:      Effort: Pulmonary effort is normal.      Breath sounds: Normal breath sounds. No wheezing, rhonchi or rales.   Chest:      Chest wall: No tenderness.   Abdominal:      General: Bowel sounds are normal. There is no distension.      Palpations: Abdomen is soft. There is no mass.      Tenderness: There is no abdominal tenderness. There is no guarding or rebound.   Musculoskeletal:         General: No swelling or deformity. Normal range of motion.      Cervical back: Normal range of motion. No rigidity.      Right lower leg: No edema.      Left lower leg: No edema.   Lymphadenopathy:      Cervical: No cervical adenopathy.   Skin:     General: Skin is warm and dry.      Capillary Refill: Capillary refill takes less than 2 seconds.      Coloration: Skin is not pale.      Findings: No erythema or rash.   Neurological:       General: No focal deficit present.      Mental Status: He is alert and oriented to person, place, and time.      Cranial Nerves: No cranial nerve deficit.      Motor: No weakness.      Coordination: Coordination normal.      Gait: Gait normal.   Psychiatric:         Mood and Affect: Mood normal.         Behavior: Behavior normal.         Thought Content: Thought content normal.         Judgment: Judgment normal.                   Assessment and Plan   Diagnoses and all orders for this visit:    1. Type 2 diabetes mellitus with microalbuminuria, without long-term current use of insulin (HCC) (Primary)  -     Hemoglobin A1c; Future  -     Hemoglobin A1c  I will refill his current medications and check labs.  His diabetes has been under good control with medications, although he has continued taking his Trulicity every other week instead of weekly as advised.  He says he does this for cost savings, and as long as his A1c remains good and blood sugars are okay he does not seem to be interested in taking it weekly, although he said he would if he needed to.  2. Need for immunization against influenza  -     Fluzone High-Dose 65+yrs  -     rosuvastatin (CRESTOR) 20 MG tablet; Take 1 tablet by mouth Daily.  Dispense: 90 tablet; Refill: 3    3. Mixed hyperlipidemia  -     Lipid Panel; Future  -     Lipid Panel  -     rosuvastatin (CRESTOR) 20 MG tablet; Take 1 tablet by mouth Daily.  Dispense: 90 tablet; Refill: 3    4. History of prostate cancer  Patient has been treated successfully for this and has had no signs of recurrence.  5. Encounter for long-term (current) use of other medications  -     CBC & Differential; Future  -     Comprehensive Metabolic Panel; Future  -     CBC & Differential  -     Comprehensive Metabolic Panel    6. Essential hypertension, benign    7. Primary osteoarthritis of hands, bilateral    8. Lumbar degenerative disc disease    9. Hallux rigidus of both feet    10. Shortness of breath  -      Ambulatory Referral to Cardiology  Differential diagnosis discussed, patient has no other pulmonary symptoms such as cough or wheezing.  We both understand that he needs to have cardiac evaluation done soon, but if he develops any shortness of breath or chest pain at rest, any prolonged symptoms, or any other symptoms of angina equivalent that appears unstable, he will go to the emergency room or return here promptly.  He will avoid strenuous exertion we will get cardiology appointment as soon as possible.  11. Chest pain, unspecified type  -     Ambulatory Referral to Cardiology    Other orders  -     Dulaglutide (Trulicity) 0.75 MG/0.5ML solution pen-injector; Inject 0.75 mg under the skin into the appropriate area as directed 1 (One) Time Per Week.  Dispense: 6 mL; Refill: 3  -     losartan (COZAAR) 25 MG tablet; Take 1 tablet by mouth Daily.  Dispense: 90 tablet; Refill: 2            Follow Up   Return in about 6 months (around 4/18/2023) for Annual physical.  Patient was given instructions and counseling regarding his condition or for health maintenance advice. Please see specific information pulled into the AVS if appropriate.     Neo Vaca MD

## 2022-10-21 ENCOUNTER — TELEPHONE (OUTPATIENT)
Dept: CARDIOLOGY | Facility: CLINIC | Age: 67
End: 2022-10-21

## 2022-10-21 NOTE — TELEPHONE ENCOUNTER
Chest xray looks ok   Left message for pt. Please tell him that Chest xray looks ok   Hub can tell pt . If pt has more questions please let me or ema talk to him

## 2022-10-21 NOTE — TELEPHONE ENCOUNTER
Caller: BETH Mancilla    Relationship to patient: Self    Best call back number: [phone]    Patient is needing: PT CALLED BACK, HUB RELAYED MESSAGE

## 2022-11-10 ENCOUNTER — OFFICE VISIT (OUTPATIENT)
Dept: CARDIOLOGY | Facility: CLINIC | Age: 67
End: 2022-11-10

## 2022-11-10 VITALS
OXYGEN SATURATION: 97 % | RESPIRATION RATE: 18 BRPM | TEMPERATURE: 98 F | BODY MASS INDEX: 32.78 KG/M2 | DIASTOLIC BLOOD PRESSURE: 70 MMHG | WEIGHT: 204 LBS | HEIGHT: 66 IN | SYSTOLIC BLOOD PRESSURE: 116 MMHG | HEART RATE: 84 BPM

## 2022-11-10 DIAGNOSIS — R80.9 TYPE 2 DIABETES MELLITUS WITH MICROALBUMINURIA, WITHOUT LONG-TERM CURRENT USE OF INSULIN: ICD-10-CM

## 2022-11-10 DIAGNOSIS — I10 ESSENTIAL HYPERTENSION, BENIGN: ICD-10-CM

## 2022-11-10 DIAGNOSIS — E11.29 TYPE 2 DIABETES MELLITUS WITH MICROALBUMINURIA, WITHOUT LONG-TERM CURRENT USE OF INSULIN: ICD-10-CM

## 2022-11-10 DIAGNOSIS — R07.89 CHEST PAIN, ATYPICAL: ICD-10-CM

## 2022-11-10 DIAGNOSIS — R06.09 EXERTIONAL DYSPNEA: Primary | ICD-10-CM

## 2022-11-10 DIAGNOSIS — Z72.0 TOBACCO USE: ICD-10-CM

## 2022-11-10 DIAGNOSIS — E78.2 MIXED HYPERLIPIDEMIA: ICD-10-CM

## 2022-11-10 PROCEDURE — 99214 OFFICE O/P EST MOD 30 MIN: CPT

## 2022-11-10 NOTE — PROGRESS NOTES
[facility]  [facility]  [address] [place] [address]  Dept: [phone]  Dept Fax: [phone]    Andrea Mancilla  1955    Follow Up Office Visit Note    History of Present Illness:  Andrea Mancilla is a 67 y.o. male who presents to the clinic for Follow-up and Chest Pain. Today he is accompanied by his wife. He denies additional complaints of chest discomfort, only had single isolated event while lifting treadmill, still short of breath, however no worse, he has been able to mow the yard, do yard work for hours, does take intermittent breaks to rest. His wife is present and states she does not feel overly concerned.  Echo normal Ef 65%, mild/mod concentric LVH, LV mass index increased, grade I diastolic. Nuclear normal, hyperdynamic Ef, negative for ischemia. CXR also normal, still smoking 1-2 cigarettes daily, advised to quit. He denies additional complaints today, no palpitations, LE edema, orthopnea, PND. Wife states he does snore, however not so loud can hear through wall, denies noting apneic episodes and he denies feeling overly tired, sleeps well at night. We did discuss possible sleep evaluation however will hold at this time. He has been taking Crestor 40 increased last visit and denies complaints. PE today seems normal, vitals stable /70 on recheck. Will observe symptoms for now and re-evaluate 6 months or sooner with new or worsening complaints. He is agreeable to this plan.     The following portions of the patient's history were reviewed and updated as appropriate: allergies, current medications, past family history, past medical history, past social history, past surgical history, and problem list.    Medications:  coenzyme Q10  glucosamine-chondroitin capsule  ibuprofen  losartan  Omega-3 Krill Oil capsule  rosuvastatin  technetium tetrofosmin  Trulicity solution pen-injector    Subjective  Allergies   Allergen Reactions   •  "Metformin Diarrhea        History reviewed. No pertinent past medical history.    History reviewed. No pertinent surgical history.    History reviewed. No pertinent family history.     Social History     Socioeconomic History   • Marital status:    Tobacco Use   • Smoking status: Some Days     Packs/day: 0.15     Types: Cigarettes     Start date: 1969   • Smokeless tobacco: Never   Vaping Use   • Vaping Use: Never used   Substance and Sexual Activity   • Alcohol use: Yes     Comment: OCC   • Drug use: Yes     Comment: Smokes Marijuana   • Sexual activity: Defer       Review of Systems   Respiratory: Positive for shortness of breath.    All other systems reviewed and are negative.      Cardiovascular Procedures    ECHO/MUGA:   STRESS TESTS:   CARDIAC CATH:   DEVICES:   HOLTER:   CT/MRI:   VASCULAR:   CARDIOTHORACIC:     Objective  Vitals:    11/10/22 1304   BP: 116/70   BP Location: Right arm   Patient Position: Sitting   Cuff Size: Adult   Pulse: 84   Resp: 18   Temp: 98 °F (36.7 °C)   TempSrc: Temporal   SpO2: 97%   Weight: 92.5 kg (204 lb)   Height: 167.6 cm (66\")   PainSc: 0-No pain     Body mass index is 32.93 kg/m².     Physical Exam  Constitutional:       Appearance: Healthy appearance. Not in distress.   Neck:      Vascular: No JVR. JVD normal.   Pulmonary:      Effort: Pulmonary effort is normal.      Breath sounds: Normal breath sounds. No wheezing. No rhonchi. No rales.   Chest:      Chest wall: Not tender to palpatation.   Cardiovascular:      PMI at left midclavicular line. Normal rate. Regular rhythm. Normal S1. Normal S2.      Murmurs: There is no murmur.      No gallop. No click. No rub.   Pulses:     Intact distal pulses.   Edema:     Peripheral edema absent.   Abdominal:      General: Bowel sounds are normal.      Palpations: Abdomen is soft.      Tenderness: There is no abdominal tenderness.   Musculoskeletal: Normal range of motion.         General: No tenderness. Skin:     General: Skin is " warm and dry.   Neurological:      General: No focal deficit present.      Mental Status: Alert and oriented to person, place and time.          Diagnostic Data  Procedures    Assessment and Plan  Diagnoses and all orders for this visit:    1. Exertional dyspnea (Primary)  No major change, has not noticed major shortness of breath while working in yard and mowing recently. Echo grade I diastolic, normal Ef, normal Nuclear. Advised he stop smoking and discussed possible MARCELLO. Will continue to monitor. Consider possible sleep evaluation in future.     2. Chest pain, atypical  Resolved. No additional episodes. Normal Nuclear, Echo. Will continue to monitor.     3. Essential hypertension, benign  On Losartan 25 mg qd, BP good today 116/70 on recheck. Continue current therapy.    4. Mixed hyperlipidemia  On Crestor 40 mg increased dosing last visit, goal < 70. Denies complaints, tolerating well. Advised will need Lipid, CK, CMP next visit.     5. Type 2 diabetes mellitus with microalbuminuria, without long-term current use of insulin (HCC)  8 yr history. Managed by PCP. A1C 6.7 October 2022.     6. Tobacco use  Still smoking 1-2 cigarettes daily, also some marijuana. Advised to quit.        Return in about 6 months (around 5/10/2023) for Recheck.    Katelin Lopez, APRN  11/10/2022

## 2023-04-17 ENCOUNTER — OFFICE VISIT (OUTPATIENT)
Dept: FAMILY MEDICINE CLINIC | Facility: CLINIC | Age: 68
End: 2023-04-17
Payer: MEDICARE

## 2023-04-17 VITALS
HEIGHT: 66 IN | DIASTOLIC BLOOD PRESSURE: 70 MMHG | BODY MASS INDEX: 32.29 KG/M2 | HEART RATE: 86 BPM | OXYGEN SATURATION: 97 % | SYSTOLIC BLOOD PRESSURE: 110 MMHG | WEIGHT: 200.9 LBS

## 2023-04-17 DIAGNOSIS — M20.21 HALLUX RIGIDUS OF BOTH FEET: ICD-10-CM

## 2023-04-17 DIAGNOSIS — Z12.11 COLON CANCER SCREENING: ICD-10-CM

## 2023-04-17 DIAGNOSIS — Z00.01 ENCOUNTER FOR GENERAL ADULT MEDICAL EXAMINATION WITH ABNORMAL FINDINGS: Primary | ICD-10-CM

## 2023-04-17 DIAGNOSIS — M20.22 HALLUX RIGIDUS OF BOTH FEET: ICD-10-CM

## 2023-04-17 DIAGNOSIS — M19.041 PRIMARY OSTEOARTHRITIS OF HANDS, BILATERAL: ICD-10-CM

## 2023-04-17 DIAGNOSIS — Z85.46 HISTORY OF PROSTATE CANCER: ICD-10-CM

## 2023-04-17 DIAGNOSIS — Z79.899 ENCOUNTER FOR LONG-TERM (CURRENT) USE OF OTHER MEDICATIONS: ICD-10-CM

## 2023-04-17 DIAGNOSIS — M51.36 LUMBAR DEGENERATIVE DISC DISEASE: ICD-10-CM

## 2023-04-17 DIAGNOSIS — R80.9 TYPE 2 DIABETES MELLITUS WITH MICROALBUMINURIA, WITHOUT LONG-TERM CURRENT USE OF INSULIN: ICD-10-CM

## 2023-04-17 DIAGNOSIS — E11.29 TYPE 2 DIABETES MELLITUS WITH MICROALBUMINURIA, WITHOUT LONG-TERM CURRENT USE OF INSULIN: ICD-10-CM

## 2023-04-17 DIAGNOSIS — R53.83 OTHER FATIGUE: ICD-10-CM

## 2023-04-17 DIAGNOSIS — M19.042 PRIMARY OSTEOARTHRITIS OF HANDS, BILATERAL: ICD-10-CM

## 2023-04-17 DIAGNOSIS — E78.2 MIXED HYPERLIPIDEMIA: ICD-10-CM

## 2023-04-17 DIAGNOSIS — I10 ESSENTIAL HYPERTENSION, BENIGN: ICD-10-CM

## 2023-04-17 PROBLEM — H02.834 DERMATOCHALASIS OF BOTH UPPER EYELIDS: Status: ACTIVE | Noted: 2018-12-18

## 2023-04-17 PROBLEM — E11.9 TYPE 2 DIABETES MELLITUS WITHOUT COMPLICATION: Status: ACTIVE | Noted: 2018-12-18

## 2023-04-17 PROBLEM — H02.831 DERMATOCHALASIS OF BOTH UPPER EYELIDS: Status: ACTIVE | Noted: 2018-12-18

## 2023-04-17 PROBLEM — H25.819 COMBINED FORM OF SENILE CATARACT: Status: ACTIVE | Noted: 2018-12-18

## 2023-04-17 LAB
POC CREATININE URINE: 26.5
POC MICROALBUMIN URINE: NORMAL

## 2023-04-17 RX ORDER — IBUPROFEN 800 MG/1
800 TABLET ORAL EVERY 6 HOURS PRN
Qty: 180 TABLET | Refills: 2 | Status: SHIPPED | OUTPATIENT
Start: 2023-04-17

## 2023-04-17 RX ORDER — ROSUVASTATIN CALCIUM 40 MG/1
40 TABLET, COATED ORAL NIGHTLY
Qty: 90 TABLET | Refills: 2 | Status: SHIPPED | OUTPATIENT
Start: 2023-04-17

## 2023-04-17 RX ORDER — DULAGLUTIDE 0.75 MG/.5ML
0.5 INJECTION, SOLUTION SUBCUTANEOUS WEEKLY
Qty: 6 ML | Refills: 3 | Status: SHIPPED | OUTPATIENT
Start: 2023-04-17

## 2023-04-17 RX ORDER — LOSARTAN POTASSIUM 25 MG/1
25 TABLET ORAL DAILY
Qty: 90 TABLET | Refills: 2 | Status: SHIPPED | OUTPATIENT
Start: 2023-04-17

## 2023-04-17 NOTE — PROGRESS NOTES
"Chief Complaint  Annual Exam    Subjective      Andrea Mancilla presents to [facility]  History of Present Illness  Patient is here for annual exam.  He says he is feeling okay.  He did have foot surgery earlier this year for a painful bunion on his right foot, so spent about 6 weeks in a wheelchair, and admits that he got pretty weak during that time but he is almost back to his baseline now.  He also has been having some left shoulder pain with the orthopedist gave him a cortisone shot and feels better.  Objective   Vital Signs:   Vitals:    04/17/23 0950   BP: 110/70   BP Location: Left arm   Patient Position: Sitting   Cuff Size: Large Adult   Pulse: 86   SpO2: 97%   Weight: 91.1 kg (200 lb 14.4 oz)   Height: 167.6 cm (66\")      /70 (BP Location: Left arm, Patient Position: Sitting, Cuff Size: Large Adult)   Pulse 86   Ht 167.6 cm (66\")   Wt 91.1 kg (200 lb 14.4 oz)   SpO2 97%   BMI 32.43 kg/m²     Body mass index is 32.43 kg/m².    Review of Systems   Constitutional: Negative for chills, fever and unexpected weight loss.   HENT: Negative for ear discharge, ear pain, mouth sores, nosebleeds, rhinorrhea, sinus pressure, sore throat, swollen glands and trouble swallowing.    Eyes: Negative for blurred vision, double vision, pain, redness and visual disturbance.   Respiratory: Negative for cough, chest tightness, shortness of breath and wheezing.    Cardiovascular: Negative for chest pain, palpitations and leg swelling.        PND, orthopnea   Gastrointestinal: Negative for abdominal distention, abdominal pain, blood in stool, constipation, diarrhea, nausea, vomiting and GERD.        Dysphagia, odynophagia   Endocrine: Negative for polydipsia, polyphagia and polyuria.   Genitourinary: Negative for dysuria, frequency, hematuria and urinary incontinence.   Musculoskeletal: Negative for arthralgias (unusual/atypica), back pain, gait problem, joint swelling, myalgias and " neck pain.   Skin: Negative for rash, skin lesions (worrisome/suspicious) and bruise.   Allergic/Immunologic: Negative for food allergies.   Neurological: Negative for dizziness, tremors, seizures, syncope, weakness, light-headedness, numbness and headache.   Hematological: Negative for adenopathy. Does not bruise/bleed easily.   Psychiatric/Behavioral: Negative for suicidal ideas and depressed mood. The patient is not nervous/anxious.        Past History:  Medical History: has a past medical history of Diabetes mellitus, Hyperlipidemia, and Hypertension.   Surgical History: has a past surgical history that includes Knee surgery (Left, 2003); Toe Surgery (Right, 12/22/2022); Back surgery (1998); and Prostate surgery.   Family History: family history includes Lung cancer in his father.   Social History: reports that he has been smoking cigarettes. He started smoking about 54 years ago. He has a 7.50 pack-year smoking history. He has never used smokeless tobacco. He reports current alcohol use. He reports current drug use.      Current Outpatient Medications:   •  coenzyme Q10 100 MG capsule, Take 1 capsule by mouth Daily., Disp: , Rfl:   •  Dulaglutide (Trulicity) 0.75 MG/0.5ML solution pen-injector, Inject 0.75 mg under the skin into the appropriate area as directed 1 (One) Time Per Week., Disp: 6 mL, Rfl: 3  •  glucosamine-chondroitin 500-400 MG capsule capsule, Take  by mouth 3 (Three) Times a Day With Meals., Disp: , Rfl:   •  ibuprofen (ADVIL,MOTRIN) 800 MG tablet, Take 1 tablet by mouth Every 6 (Six) Hours As Needed (As needed)., Disp: 180 tablet, Rfl: 2  •  losartan (COZAAR) 25 MG tablet, Take 1 tablet by mouth Daily., Disp: 90 tablet, Rfl: 2  •  Omega-3 Krill Oil 300 MG capsule, Take  by mouth., Disp: , Rfl:   •  rosuvastatin (CRESTOR) 40 MG tablet, Take 1 tablet by mouth Every Night., Disp: 90 tablet, Rfl: 2    Current Facility-Administered Medications:   •  technetium tetrofosmin (Tc-MYOVIEW) injection 1  dose, 1 dose, Intravenous, Once in imaging, Kateiln Lopez Marguerite, [place]    Allergies: Metformin    Physical Exam  Constitutional:       General: He is not in acute distress.     Appearance: He is obese. He is not toxic-appearing.   HENT:      Head: Normocephalic and atraumatic.      Right Ear: Ear canal and external ear normal.      Left Ear: Ear canal and external ear normal.      Nose: Nose normal.      Mouth/Throat:      Mouth: Mucous membranes are moist.      Pharynx: Oropharynx is clear.   Eyes:      General: No scleral icterus.     Extraocular Movements: Extraocular movements intact.      Conjunctiva/sclera: Conjunctivae normal.      Pupils: Pupils are equal, round, and reactive to light.   Neck:      Vascular: No carotid bruit.   Cardiovascular:      Rate and Rhythm: Normal rate and regular rhythm.      Pulses: Normal pulses.           Dorsalis pedis pulses are 2+ on the right side and 2+ on the left side.        Posterior tibial pulses are 2+ on the right side and 2+ on the left side.      Heart sounds: Normal heart sounds.   Pulmonary:      Effort: Pulmonary effort is normal.      Breath sounds: Normal breath sounds.   Chest:      Chest wall: No tenderness.   Abdominal:      General: Bowel sounds are normal. There is no distension.      Palpations: Abdomen is soft.      Tenderness: There is no abdominal tenderness. There is no guarding or rebound.   Musculoskeletal:         General: No swelling, tenderness or deformity. Normal range of motion.      Cervical back: Normal range of motion. No rigidity.      Right lower leg: No edema.      Left lower leg: No edema.      Right foot: No deformity.      Left foot: No deformity.   Feet:      Right foot:      Protective Sensation: 5 sites tested. 5 sites sensed.      Skin integrity: Skin integrity normal.      Left foot:      Protective Sensation: 5 sites tested. 5 sites sensed.      Skin integrity: Skin integrity normal.      Comments:      Lymphadenopathy:       Cervical: No cervical adenopathy.   Skin:     General: Skin is warm and dry.      Capillary Refill: Capillary refill takes less than 2 seconds.      Coloration: Skin is not pale.      Findings: No erythema or rash.   Neurological:      General: No focal deficit present.      Mental Status: He is alert and oriented to person, place, and time.      Cranial Nerves: No cranial nerve deficit.      Motor: No weakness.      Coordination: Coordination normal.      Gait: Gait normal.   Psychiatric:         Mood and Affect: Mood normal.         Behavior: Behavior normal.         Judgment: Judgment normal.                   Assessment and Plan   Diagnoses and all orders for this visit:    1. Encounter for general adult medical examination with abnormal findings (Primary)  Healthy lifestyle measures including healthy diet regular exercise discussed.  Preventive healthcare measures also discussed.  He will continue current medications and we will check labs.  If all goes well I will see him back in 6 months or sooner if needed  2. Type 2 diabetes mellitus with microalbuminuria, without long-term current use of insulin  -     POC Microalbumin  -     Hemoglobin A1c; Future  -     Hemoglobin A1c  Patient's diabetes has been well controlled with Trulicity, although he still only takes injection every other week, even though we discussed on the occasions that it supposed to be taken weekly.  Regardless, he does not wish to do that, and says as long as his A1c has been good with taking it every other week he will not increase the frequency.  He cannot tolerate metformin at any dose or any formulation.  He is on losartan for the microalbuminuria  3. Mixed hyperlipidemia  -     Lipid Panel; Future  -     Lipid Panel  -     rosuvastatin (CRESTOR) 40 MG tablet; Take 1 tablet by mouth Every Night.  Dispense: 90 tablet; Refill: 2    4. History of prostate cancer  -     PSA DIAGNOSTIC; Future    5. Encounter for long-term (current) use of  other medications  -     CBC & Differential; Future  -     Comprehensive Metabolic Panel; Future  -     Comprehensive Metabolic Panel    6. Essential hypertension, benign    7. Primary osteoarthritis of hands, bilateral    8. Lumbar degenerative disc disease    9. Hallux rigidus of both feet    10. Other fatigue  -     TSH+Free T4; Future  -     TSH+Free T4    11. Colon cancer screening  -     Cologuard - Stool, Per Rectum; Future  The patient thought he did a Cologuard test about 3 years ago, but we do not have the records in the chart and have not been able to access the old computer system due to technical difficulties.  We called the Cologuard company and oddly, they stated they did not have his name in their system.  However, the patient clearly describes receiving a Cologuard box in the mail and submitting a specimen in a way that sounds extremely accurate.  Regardless, he is sure its been at least 3 years so we will order Cologuard as he refuses to do colonoscopy, but is glad to do the Cologuard test.  Other orders  -     Dulaglutide (Trulicity) 0.75 MG/0.5ML solution pen-injector; Inject 0.75 mg under the skin into the appropriate area as directed 1 (One) Time Per Week.  Dispense: 6 mL; Refill: 3  -     ibuprofen (ADVIL,MOTRIN) 800 MG tablet; Take 1 tablet by mouth Every 6 (Six) Hours As Needed (As needed).  Dispense: 180 tablet; Refill: 2  -     losartan (COZAAR) 25 MG tablet; Take 1 tablet by mouth Daily.  Dispense: 90 tablet; Refill: 2            Follow Up   No follow-ups on file.  Patient was given instructions and counseling regarding his condition or for health maintenance advice. Please see specific information pulled into the AVS if appropriate.     Neo Vaca MD

## 2023-04-18 DIAGNOSIS — R80.9 TYPE 2 DIABETES MELLITUS WITH MICROALBUMINURIA, WITHOUT LONG-TERM CURRENT USE OF INSULIN: Primary | ICD-10-CM

## 2023-04-18 DIAGNOSIS — E11.29 TYPE 2 DIABETES MELLITUS WITH MICROALBUMINURIA, WITHOUT LONG-TERM CURRENT USE OF INSULIN: Primary | ICD-10-CM

## 2023-04-18 LAB
ALBUMIN SERPL-MCNC: 4.6 G/DL (ref 3.8–4.8)
ALBUMIN/GLOB SERPL: 1.9 {RATIO} (ref 1.2–2.2)
ALP SERPL-CCNC: 102 IU/L (ref 44–121)
ALT SERPL-CCNC: 31 IU/L (ref 0–44)
AST SERPL-CCNC: 16 IU/L (ref 0–40)
BILIRUB SERPL-MCNC: 1 MG/DL (ref 0–1.2)
BUN SERPL-MCNC: 13 MG/DL (ref 8–27)
BUN/CREAT SERPL: 13 (ref 10–24)
CALCIUM SERPL-MCNC: 9.8 MG/DL (ref 8.6–10.2)
CHLORIDE SERPL-SCNC: 102 MMOL/L (ref 96–106)
CHOLEST SERPL-MCNC: 137 MG/DL (ref 100–199)
CO2 SERPL-SCNC: 26 MMOL/L (ref 20–29)
CREAT SERPL-MCNC: 0.97 MG/DL (ref 0.76–1.27)
EGFRCR SERPLBLD CKD-EPI 2021: 86 ML/MIN/1.73
GLOBULIN SER CALC-MCNC: 2.4 G/DL (ref 1.5–4.5)
GLUCOSE SERPL-MCNC: 153 MG/DL (ref 70–99)
HBA1C MFR BLD: 8 % (ref 4.8–5.6)
HDLC SERPL-MCNC: 40 MG/DL
LDLC SERPL CALC-MCNC: 80 MG/DL (ref 0–99)
POTASSIUM SERPL-SCNC: 5.5 MMOL/L (ref 3.5–5.2)
PROT SERPL-MCNC: 7 G/DL (ref 6–8.5)
SODIUM SERPL-SCNC: 140 MMOL/L (ref 134–144)
T4 FREE SERPL-MCNC: 1.47 NG/DL (ref 0.82–1.77)
TRIGL SERPL-MCNC: 91 MG/DL (ref 0–149)
TSH SERPL DL<=0.005 MIU/L-ACNC: 2.29 UIU/ML (ref 0.45–4.5)
VLDLC SERPL CALC-MCNC: 17 MG/DL (ref 5–40)

## 2023-04-19 ENCOUNTER — TELEPHONE (OUTPATIENT)
Dept: FAMILY MEDICINE CLINIC | Facility: CLINIC | Age: 68
End: 2023-04-19

## 2023-04-19 NOTE — TELEPHONE ENCOUNTER
[facility] [address] [phone] [facility] [phone]      Pharmacy representative name: BIBI     Pharmacy representative phone number: [phone]    What medication are you calling in regards to: Dulaglutide (Trulicity) 0.75 MG/0.5ML solution pen-injector    What question does the pharmacy have: PHARMACY IS NEEDING CLINICAL INFORMATION FOR THE PRIOR AUTHORIZATION.     CASE NUMBER: [id number]  TURN AROUND TIME 71 HOURS     Who is the provider that prescribed the medication: MD FONTAINE

## 2023-04-19 NOTE — TELEPHONE ENCOUNTER
[facility]  [facility] [address] [phone] [facility] [phone]       Pharmacy representative name: BIBI      Pharmacy representative phone number: [phone]     What medication are you calling in regards to: Dulaglutide (Trulicity) 0.75 MG/0.5ML solution pen-injector     What question does the pharmacy have: PHARMACY IS NEEDING CLINICAL INFORMATION FOR THE PRIOR AUTHORIZATION.      CASE NUMBER: [id number]  TURN AROUND TIME 71 HOURS      Who is the provider that prescribed the medication: MD FONTAINE    called pt left message to discuss medication cost

## 2023-04-20 ENCOUNTER — TELEPHONE (OUTPATIENT)
Dept: FAMILY MEDICINE CLINIC | Facility: CLINIC | Age: 68
End: 2023-04-20

## 2023-04-20 NOTE — TELEPHONE ENCOUNTER
Caller: Andrea Mancilla    Relationship to patient: Self    Best call back number: [phone]    Patient is needing: PATIENT RETURNED MISSED CALL TO DIONE REGARDING MEDICATIONS

## 2023-04-21 ENCOUNTER — TELEPHONE (OUTPATIENT)
Dept: FAMILY MEDICINE CLINIC | Facility: CLINIC | Age: 68
End: 2023-04-21
Payer: MEDICARE

## 2023-06-12 ENCOUNTER — OFFICE VISIT (OUTPATIENT)
Dept: CARDIOLOGY | Facility: CLINIC | Age: 68
End: 2023-06-12
Payer: MEDICARE

## 2023-06-12 VITALS
OXYGEN SATURATION: 97 % | HEIGHT: 66 IN | WEIGHT: 201 LBS | RESPIRATION RATE: 18 BRPM | SYSTOLIC BLOOD PRESSURE: 124 MMHG | BODY MASS INDEX: 32.3 KG/M2 | DIASTOLIC BLOOD PRESSURE: 70 MMHG | HEART RATE: 96 BPM

## 2023-06-12 DIAGNOSIS — R80.9 TYPE 2 DIABETES MELLITUS WITH MICROALBUMINURIA, WITHOUT LONG-TERM CURRENT USE OF INSULIN: ICD-10-CM

## 2023-06-12 DIAGNOSIS — I10 ESSENTIAL HYPERTENSION, BENIGN: Primary | ICD-10-CM

## 2023-06-12 DIAGNOSIS — E78.2 MIXED HYPERLIPIDEMIA: ICD-10-CM

## 2023-06-12 DIAGNOSIS — Z72.0 TOBACCO USE: ICD-10-CM

## 2023-06-12 DIAGNOSIS — E11.29 TYPE 2 DIABETES MELLITUS WITH MICROALBUMINURIA, WITHOUT LONG-TERM CURRENT USE OF INSULIN: ICD-10-CM

## 2023-06-12 PROCEDURE — 3074F SYST BP LT 130 MM HG: CPT

## 2023-06-12 PROCEDURE — 3078F DIAST BP <80 MM HG: CPT

## 2023-06-12 PROCEDURE — 99213 OFFICE O/P EST LOW 20 MIN: CPT

## 2023-06-12 PROCEDURE — 1160F RVW MEDS BY RX/DR IN RCRD: CPT

## 2023-06-12 PROCEDURE — 1159F MED LIST DOCD IN RCRD: CPT

## 2023-06-12 NOTE — PROGRESS NOTES
[facility]  [facility]  [address] [place] [address]  Dept: [phone]  Dept Fax: [phone]    Andrea Mancilla  1955    Follow Up Office Visit Note    History of Present Illness:  Andrea Mancilla is a 68 y.o. male who presents to the clinic for Follow-up.  He is doing well today denies all cardiac complaints.  HTN on losartan 25 daily, BP has been very good 124/70 today.  HLP on Crestor 40 dose adjusted last visit, recent LDL 80, trig 91 without major change April 2023.  He has been relatively sedentary his A1c is now up to 8.0.  He had surgery on his right foot and has not been able to participate in normal activities.  We discussed activity, nutritional guidance today.  We will recheck lipids at follow-up.  This time continue current management    The following portions of the patient's history were reviewed and updated as appropriate: allergies, current medications, past family history, past medical history, past social history, past surgical history, and problem list.    Medications:  coenzyme Q10  glucosamine-chondroitin capsule  ibuprofen  losartan  Omega-3 Krill Oil capsule  rosuvastatin  technetium tetrofosmin  Trulicity solution pen-injector    Subjective  Allergies   Allergen Reactions   • Metformin Diarrhea        Past Medical History:   Diagnosis Date   • Diabetes mellitus    • Hyperlipidemia    • Hypertension        Past Surgical History:   Procedure Laterality Date   • BACK SURGERY  1998    L4-L5   • KNEE SURGERY Left 2003   • PROSTATE SURGERY     • TOE SURGERY Right 12/22/2022    Bunionectomy       Family History   Problem Relation Age of Onset   • Lung cancer Father         Social History     Socioeconomic History   • Marital status:    Tobacco Use   • Smoking status: Some Days     Packs/day: 0.15     Years: 50.00     Pack years: 7.50     Types: Cigarettes     Start date: 1969   • Smokeless tobacco: Never   Vaping Use   •  "Vaping Use: Never used   Substance and Sexual Activity   • Alcohol use: Yes     Comment: OCC   • Drug use: Yes     Comment: Smokes Marijuana   • Sexual activity: Defer       Review of Systems   All other systems reviewed and are negative.      Cardiovascular Procedures    ECHO/MUGA:   STRESS TESTS:   CARDIAC CATH:   DEVICES:   HOLTER:   CT/MRI:   VASCULAR:   CARDIOTHORACIC:     Objective  Vitals:    06/12/23 1413   BP: 124/70   BP Location: Right arm   Patient Position: Sitting   Cuff Size: Large Adult   Pulse: 96   Resp: 18   SpO2: 97%   Weight: 91.2 kg (201 lb)   Height: 167.6 cm (66\")   PainSc: 0-No pain     Body mass index is 32.44 kg/m².     Physical Exam  Vitals reviewed.   Constitutional:       General: Awake.      Appearance: Normal and healthy appearance. Not in distress.   Neck:      Vascular: No JVR. JVD normal.   Pulmonary:      Effort: Pulmonary effort is normal.      Breath sounds: Normal breath sounds. No wheezing. No rhonchi. No rales.   Chest:      Chest wall: Not tender to palpatation.   Cardiovascular:      PMI at left midclavicular line. Normal rate. Regular rhythm. Normal S1. Normal S2.      Murmurs: There is no murmur.      No gallop. No click. No rub.   Pulses:     Intact distal pulses.   Edema:     Peripheral edema absent.   Abdominal:      General: Bowel sounds are normal.      Palpations: Abdomen is soft.      Tenderness: There is no abdominal tenderness.   Musculoskeletal: Normal range of motion.         General: No tenderness. Skin:     General: Skin is warm and dry.   Neurological:      General: No focal deficit present.      Mental Status: Alert and oriented to person, place and time.   Psychiatric:         Behavior: Behavior is cooperative.          Diagnostic Data  Procedures    Advance Care Planning          Assessment and Plan  Diagnoses and all orders for this visit:    1. Essential hypertension, benign (Primary)  As above on losartan 25 daily, BP is good.  Continue current " therapy.    2. Mixed hyperlipidemia  Crestor 40, increase last visit, denies complaints, tolerating well.  LDL 80, trig 91 April 2023.  We will get lipid next visit.  Continue current therapy.    3. Tobacco use    4. Type 2 diabetes mellitus with microalbuminuria, without long-term current use of insulin     He will be traveling to [place] soon.       Return in about 6 months (around 12/12/2023) for Recheck, Katelin GLEZ.    [place]WTON Camp  06/12/2023    Part of this note may be an electronic transcription/translation of spoken language to printed text using the Dragon Dictation System.

## 2023-10-17 ENCOUNTER — OFFICE VISIT (OUTPATIENT)
Dept: FAMILY MEDICINE CLINIC | Facility: CLINIC | Age: 68
End: 2023-10-17
Payer: MEDICARE

## 2023-10-17 VITALS
WEIGHT: 199.7 LBS | OXYGEN SATURATION: 96 % | HEIGHT: 66 IN | BODY MASS INDEX: 32.09 KG/M2 | SYSTOLIC BLOOD PRESSURE: 138 MMHG | HEART RATE: 93 BPM | DIASTOLIC BLOOD PRESSURE: 82 MMHG

## 2023-10-17 DIAGNOSIS — Z85.46 PERSONAL HISTORY OF PROSTATE CANCER: ICD-10-CM

## 2023-10-17 DIAGNOSIS — Z79.899 ENCOUNTER FOR LONG-TERM (CURRENT) USE OF OTHER MEDICATIONS: ICD-10-CM

## 2023-10-17 DIAGNOSIS — Z12.5 PROSTATE CANCER SCREENING: ICD-10-CM

## 2023-10-17 DIAGNOSIS — I10 ESSENTIAL HYPERTENSION, BENIGN: ICD-10-CM

## 2023-10-17 DIAGNOSIS — E78.2 MIXED HYPERLIPIDEMIA: ICD-10-CM

## 2023-10-17 DIAGNOSIS — E11.29 TYPE 2 DIABETES MELLITUS WITH MICROALBUMINURIA, WITHOUT LONG-TERM CURRENT USE OF INSULIN: Primary | ICD-10-CM

## 2023-10-17 DIAGNOSIS — R80.9 TYPE 2 DIABETES MELLITUS WITH MICROALBUMINURIA, WITHOUT LONG-TERM CURRENT USE OF INSULIN: Primary | ICD-10-CM

## 2023-10-17 RX ORDER — IBUPROFEN 800 MG/1
800 TABLET ORAL EVERY 6 HOURS PRN
Qty: 180 TABLET | Refills: 2 | Status: SHIPPED | OUTPATIENT
Start: 2023-10-17

## 2023-10-17 RX ORDER — ROSUVASTATIN CALCIUM 40 MG/1
40 TABLET, COATED ORAL NIGHTLY
Qty: 90 TABLET | Refills: 2 | Status: SHIPPED | OUTPATIENT
Start: 2023-10-17

## 2023-10-17 RX ORDER — LOSARTAN POTASSIUM 25 MG/1
25 TABLET ORAL DAILY
Qty: 90 TABLET | Refills: 2 | Status: SHIPPED | OUTPATIENT
Start: 2023-10-17

## 2023-10-18 LAB
ALBUMIN SERPL-MCNC: 4.4 G/DL (ref 3.9–4.9)
ALBUMIN/GLOB SERPL: 1.9 {RATIO} (ref 1.2–2.2)
ALP SERPL-CCNC: 89 IU/L (ref 44–121)
ALT SERPL-CCNC: 29 IU/L (ref 0–44)
AST SERPL-CCNC: 16 IU/L (ref 0–40)
BASOPHILS # BLD AUTO: 0.1 X10E3/UL (ref 0–0.2)
BASOPHILS NFR BLD AUTO: 1 %
BILIRUB SERPL-MCNC: 1.1 MG/DL (ref 0–1.2)
BUN SERPL-MCNC: 12 MG/DL (ref 8–27)
BUN/CREAT SERPL: 12 (ref 10–24)
CALCIUM SERPL-MCNC: 9.1 MG/DL (ref 8.6–10.2)
CHLORIDE SERPL-SCNC: 104 MMOL/L (ref 96–106)
CHOLEST SERPL-MCNC: 122 MG/DL (ref 100–199)
CO2 SERPL-SCNC: 26 MMOL/L (ref 20–29)
CREAT SERPL-MCNC: 0.98 MG/DL (ref 0.76–1.27)
EGFRCR SERPLBLD CKD-EPI 2021: 85 ML/MIN/1.73
EOSINOPHIL # BLD AUTO: 0.1 X10E3/UL (ref 0–0.4)
EOSINOPHIL NFR BLD AUTO: 2 %
ERYTHROCYTE [DISTWIDTH] IN BLOOD BY AUTOMATED COUNT: 13.4 % (ref 11.6–15.4)
GLOBULIN SER CALC-MCNC: 2.3 G/DL (ref 1.5–4.5)
GLUCOSE SERPL-MCNC: 139 MG/DL (ref 70–99)
HBA1C MFR BLD: 6.8 % (ref 4.8–5.6)
HCT VFR BLD AUTO: 47.5 % (ref 37.5–51)
HDLC SERPL-MCNC: 37 MG/DL
HGB BLD-MCNC: 15.6 G/DL (ref 13–17.7)
IMM GRANULOCYTES # BLD AUTO: 0 X10E3/UL (ref 0–0.1)
IMM GRANULOCYTES NFR BLD AUTO: 0 %
LDLC SERPL CALC-MCNC: 70 MG/DL (ref 0–99)
LYMPHOCYTES # BLD AUTO: 1.1 X10E3/UL (ref 0.7–3.1)
LYMPHOCYTES NFR BLD AUTO: 15 %
MCH RBC QN AUTO: 29.1 PG (ref 26.6–33)
MCHC RBC AUTO-ENTMCNC: 32.8 G/DL (ref 31.5–35.7)
MCV RBC AUTO: 89 FL (ref 79–97)
MONOCYTES # BLD AUTO: 1 X10E3/UL (ref 0.1–0.9)
MONOCYTES NFR BLD AUTO: 13 %
NEUTROPHILS # BLD AUTO: 5.4 X10E3/UL (ref 1.4–7)
NEUTROPHILS NFR BLD AUTO: 69 %
PLATELET # BLD AUTO: 190 X10E3/UL (ref 150–450)
POTASSIUM SERPL-SCNC: 4.3 MMOL/L (ref 3.5–5.2)
PROT SERPL-MCNC: 6.7 G/DL (ref 6–8.5)
PSA SERPL-MCNC: <0.1 NG/ML (ref 0–4)
RBC # BLD AUTO: 5.36 X10E6/UL (ref 4.14–5.8)
SODIUM SERPL-SCNC: 141 MMOL/L (ref 134–144)
TRIGL SERPL-MCNC: 75 MG/DL (ref 0–149)
VLDLC SERPL CALC-MCNC: 15 MG/DL (ref 5–40)
WBC # BLD AUTO: 7.7 X10E3/UL (ref 3.4–10.8)

## 2023-10-18 NOTE — PROGRESS NOTES
"Chief Complaint  Diabetes    Subjective      Andrea Mancilla presents to [facility]  History of Present Illness  Patient is here for follow-up on diabetes.  For the last couple years he had been taking Trulicity every other week because he felt like it was controlling his diabetes adequately, and has a cost saving measure.  However, his A1c 6 months ago was over 8, so he has been taking the medicine weekly since that time, and his follow-up A1c 3 months ago was satisfactory.  He says he is doing well overall and denies any new problems.  Objective   Vital Signs:   Vitals:    10/17/23 0832   BP: 138/82   BP Location: Left arm   Patient Position: Sitting   Cuff Size: Adult   Pulse: 93   SpO2: 96%   Weight: 90.6 kg (199 lb 11.2 oz)   Height: 167.6 cm (66\")      /82 (BP Location: Left arm, Patient Position: Sitting, Cuff Size: Adult)   Pulse 93   Ht 167.6 cm (66\")   Wt 90.6 kg (199 lb 11.2 oz)   SpO2 96%   BMI 32.23 kg/m²     Body mass index is 32.23 kg/m².    Review of Systems   Constitutional:  Negative for chills, fever and unexpected weight loss.   HENT:  Negative for ear discharge, ear pain, mouth sores, nosebleeds, rhinorrhea, sinus pressure, sore throat, swollen glands and trouble swallowing.    Eyes:  Negative for blurred vision, double vision, pain, redness and visual disturbance.   Respiratory:  Negative for cough, shortness of breath and wheezing.    Cardiovascular:  Negative for chest pain, palpitations and leg swelling.        PND, orthopnea   Gastrointestinal:  Negative for abdominal distention, abdominal pain, blood in stool, constipation, diarrhea, nausea, vomiting and GERD.        Dysphagia, odynophagia   Endocrine: Negative for polydipsia, polyphagia and polyuria.   Genitourinary:  Negative for dysuria, hematuria, urgency and urinary incontinence.   Musculoskeletal:  Negative for arthralgias (unusual/atypica), gait problem, joint swelling and myalgias. "   Skin:  Negative for rash, skin lesions (worrisome/suspicious), wound and bruise.   Allergic/Immunologic: Negative for food allergies.   Neurological:  Negative for dizziness, tremors, seizures, syncope, weakness, light-headedness, numbness and headache.   Hematological:  Negative for adenopathy. Does not bruise/bleed easily.   Psychiatric/Behavioral:  Negative for suicidal ideas and depressed mood. The patient is not nervous/anxious.        Past History:  Medical History: has a past medical history of Colon cancer screening (04/18/2023), Diabetes mellitus, Hyperlipidemia, Hypertension, and Prostate cancer.   Surgical History: has a past surgical history that includes Knee surgery (Left, 2003); Toe Surgery (Right, 12/22/2022); Back surgery (1998); and Prostate surgery.   Family History: family history includes Lung cancer in his father.   Social History: reports that he has been smoking cigarettes. He started smoking about 54 years ago. He has a 7.50 pack-year smoking history. He has never used smokeless tobacco. He reports current alcohol use. He reports current drug use.      Current Outpatient Medications:     coenzyme Q10 100 MG capsule, Take 1 capsule by mouth Daily., Disp: , Rfl:     Dulaglutide (Trulicity) 0.75 MG/0.5ML solution pen-injector, Inject 0.75 mg under the skin into the appropriate area as directed 1 (One) Time Per Week., Disp: 6 mL, Rfl: 3    glucosamine-chondroitin 500-400 MG capsule capsule, Take  by mouth 3 (Three) Times a Day With Meals., Disp: , Rfl:     ibuprofen (ADVIL,MOTRIN) 800 MG tablet, Take 1 tablet by mouth Every 6 (Six) Hours As Needed (As needed)., Disp: 180 tablet, Rfl: 2    losartan (COZAAR) 25 MG tablet, Take 1 tablet by mouth Daily., Disp: 90 tablet, Rfl: 2    Omega-3 Krill Oil 300 MG capsule, Take  by mouth., Disp: , Rfl:     rosuvastatin (CRESTOR) 40 MG tablet, Take 1 tablet by mouth Every Night., Disp: 90 tablet, Rfl: 2    RSVPreF3 Vac Recomb Adjuvanted (AREXVY) 120  MCG/0.5ML reconstituted suspension injection, Inject 0.5 mL into the appropriate muscle as directed by prescriber 1 (One) Time for 1 dose., Disp: 0.5 mL, Rfl: 0    Current Facility-Administered Medications:     technetium tetrofosmin (Tc-MYOVIEW) injection 1 dose, 1 dose, Intravenous, Once in imaging, Katelin Lopezdwell, [place]    Allergies: Metformin    Physical Exam  Constitutional:       General: He is not in acute distress.     Appearance: He is obese. He is not toxic-appearing.   HENT:      Head: Normocephalic.      Right Ear: Ear canal and external ear normal.      Left Ear: Ear canal and external ear normal.      Nose: Nose normal.      Mouth/Throat:      Mouth: Mucous membranes are moist.      Pharynx: Oropharynx is clear.   Eyes:      General: No scleral icterus.     Extraocular Movements: Extraocular movements intact.      Conjunctiva/sclera: Conjunctivae normal.      Pupils: Pupils are equal, round, and reactive to light.   Neck:      Vascular: No carotid bruit.   Cardiovascular:      Rate and Rhythm: Normal rate and regular rhythm.      Pulses: Normal pulses.      Heart sounds: Normal heart sounds.   Pulmonary:      Effort: Pulmonary effort is normal.      Breath sounds: Normal breath sounds.   Chest:      Chest wall: No tenderness.   Abdominal:      General: Bowel sounds are normal. There is no distension.      Palpations: Abdomen is soft. There is no mass.      Tenderness: There is no abdominal tenderness. There is no guarding or rebound.   Musculoskeletal:         General: No swelling or deformity. Normal range of motion.      Cervical back: Normal range of motion. No rigidity.      Right lower leg: No edema.      Left lower leg: No edema.   Lymphadenopathy:      Cervical: No cervical adenopathy.   Skin:     General: Skin is warm and dry.      Capillary Refill: Capillary refill takes less than 2 seconds.      Coloration: Skin is not pale.      Findings: No erythema or rash.   Neurological:       General: No focal deficit present.      Mental Status: He is alert and oriented to person, place, and time.      Cranial Nerves: No cranial nerve deficit.      Motor: No weakness.      Coordination: Coordination normal.      Gait: Gait normal.   Psychiatric:         Mood and Affect: Mood normal.         Behavior: Behavior normal.         Thought Content: Thought content normal.         Judgment: Judgment normal.                   Assessment and Plan   Diagnoses and all orders for this visit:    1. Type 2 diabetes mellitus with microalbuminuria, without long-term current use of insulin (Primary)  -     Hemoglobin A1c  Diabetes is well controlled current medications and he will continue, Trulicity 0.75 mg subcutaneous weekly.  The patient cannot tolerate metformin at any dose in any formulation.  We will recheck labs today  2. Essential hypertension, benign    3. Encounter for long-term (current) use of other medications  -     CBC & Differential  -     Comprehensive Metabolic Panel    4. Mixed hyperlipidemia  -     Lipid Panel  -     rosuvastatin (CRESTOR) 40 MG tablet; Take 1 tablet by mouth Every Night.  Dispense: 90 tablet; Refill: 2    5. Prostate cancer screening  -     PSA Screen  The patient has done well since his prostatectomy and has decided not to go back to urology for any follow-up visits, unless other urinary symptoms develop or unless his PSA level goes up.  He wishes for me to continue checking his PSA annually    6. Personal history of prostate cancer--has done well with treatment and no signs of recurrence so far  Other orders  -     Fluzone High-Dose 65+yrs ([id number])  -     RSVPreF3 Vac Recomb Adjuvanted (AREXVY) 120 MCG/0.5ML reconstituted suspension injection; Inject 0.5 mL into the appropriate muscle as directed by prescriber 1 (One) Time for 1 dose.  Dispense: 0.5 mL; Refill: 0  -     ibuprofen (ADVIL,MOTRIN) 800 MG tablet; Take 1 tablet by mouth Every 6 (Six) Hours As Needed (As needed).   Dispense: 180 tablet; Refill: 2  -     losartan (COZAAR) 25 MG tablet; Take 1 tablet by mouth Daily.  Dispense: 90 tablet; Refill: 2            Follow Up   Return in about 6 months (around 4/17/2024) for Annual physical, Provider - AWV Initial.  Patient was given instructions and counseling regarding his condition or for health maintenance advice. Please see specific information pulled into the AVS if appropriate.     Neo Vaca MD

## 2023-12-11 NOTE — PROGRESS NOTES
[facility] [place]  [facility] CARDIOLOGY  [address] [place] [address]  Dept: [phone]  Dept Fax: [phone]    Andrea Mancilla  1955    Follow Up Office Visit Note    History of Present Illness:  Andrea Mancilla is a 68 y.o. male who presents to the clinic for Follow up HTN, HLP. He is doing well today. He does state to have noticed some bilateral tingling in hands upon waking on some mornings. He did state he has recently picked up pin ball machine on his back and has had some soreness, possible correlating to above complaints. No other complaints today, denies CP, SOB. He had historical normal workup with Echo, stress testing. He has been under significant stress as his wife is sick with cancer recently. Review of recent labs show improvement in cholesterol and A1C. HTN is stable. Continue current plan.     The following portions of the patient's history were reviewed and updated as appropriate: allergies, current medications, past family history, past medical history, past social history, past surgical history, and problem list.    Medications:  coenzyme Q10  glucosamine-chondroitin capsule  ibuprofen  losartan  Omega-3 Krill Oil capsule  rosuvastatin  technetium tetrofosmin  Trulicity solution pen-injector    Subjective  Allergies   Allergen Reactions    Metformin Diarrhea        Past Medical History:   Diagnosis Date    Colon cancer screening 04/18/2023    cologuard negative, repeat due 3yrs    Diabetes mellitus     Hyperlipidemia     Hypertension     Prostate cancer     s/p prostatectomy, successful       Past Surgical History:   Procedure Laterality Date    BACK SURGERY  1998    L4-L5    KNEE SURGERY Left 2003    PROSTATE SURGERY      TOE SURGERY Right 12/22/2022    Bunionectomy       Family History   Problem Relation Age of Onset    Lung cancer Father         Social History     Socioeconomic History    Marital status:    Tobacco Use    Smoking status:  "Some Days     Packs/day: 0.15     Years: 50.00     Additional pack years: 0.00     Total pack years: 7.50     Types: Cigarettes     Start date: 1969    Smokeless tobacco: Never   Vaping Use    Vaping Use: Never used   Substance and Sexual Activity    Alcohol use: Yes     Comment: OCC    Drug use: Yes     Comment: Smokes Marijuana    Sexual activity: Defer       Review of Systems   Musculoskeletal:         Bilateral hand tingling on waking at times   Psychiatric/Behavioral:  Positive for stress.    All other systems reviewed and are negative.      Cardiovascular Procedures    ECHO/MUGA:   STRESS TESTS:   CARDIAC CATH:   DEVICES:   HOLTER:   CT/MRI:   VASCULAR:   CARDIOTHORACIC:     Objective  Vitals:    12/12/23 1419   BP: 129/68   BP Location: Left arm   Patient Position: Sitting   Cuff Size: Adult   Pulse: 87   SpO2: 97%   Weight: 91.2 kg (201 lb)   Height: 167.6 cm (66\")   PainSc: 0-No pain     Body mass index is 32.44 kg/m².     Physical Exam  Vitals reviewed.   Constitutional:       General: Awake.      Appearance: Normal and healthy appearance. Not in distress.   Neck:      Vascular: No JVR. JVD normal.   Pulmonary:      Effort: Pulmonary effort is normal.      Breath sounds: Normal breath sounds. No wheezing. No rhonchi. No rales.   Chest:      Chest wall: Not tender to palpatation.   Cardiovascular:      PMI at left midclavicular line. Normal rate. Regular rhythm. Normal S1. Normal S2.       Murmurs: There is no murmur.      No gallop.  No click. No rub.   Pulses:     Intact distal pulses.   Edema:     Peripheral edema absent.   Abdominal:      General: Bowel sounds are normal.      Palpations: Abdomen is soft.      Tenderness: There is no abdominal tenderness.   Musculoskeletal: Normal range of motion.         General: No tenderness. Skin:     General: Skin is warm and dry.   Neurological:      General: No focal deficit present.      Mental Status: Alert and oriented to person, place and time.   Psychiatric: "         Behavior: Behavior is cooperative.          Diagnostic Data    ECG 12 Lead    Date/Time: 12/12/2023 2:32 PM  Performed by: Katelin Lopez APRN    Authorized by: Katelin Lopez APRN  Comparison: compared with previous ECG from 10/20/2022  Similar to previous ECG  Rhythm: sinus rhythm  Rate: normal  BPM: 81  QRS axis: normal    Clinical impression: normal ECG        Advance Care Planning          Assessment and Plan  Diagnoses and all orders for this visit:    1. Essential hypertension, benign (Primary)  Stable on Losartan 25, /78 on recheck. Continue plan.   -     ECG 12 Lead    2. Mixed hyperlipidemia  On Crestor 40, LDL improved now 70, at goal continue plan.   .  3. Tobacco use  Rare only 1-2 cigarettes at times     4. Type 2 diabetes mellitus with microalbuminuria, without long-term current use of insulin  A1C improving now at goal 6.8       Return in about 6 months (around 6/12/2024) for RechKatelin landin.    NEWTON Camp  12/12/2023    Part of this note may be an electronic transcription/translation of spoken language to printed text using the Dragon Dictation System.

## 2023-12-12 ENCOUNTER — OFFICE VISIT (OUTPATIENT)
Dept: CARDIOLOGY | Facility: CLINIC | Age: 68
End: 2023-12-12
Payer: MEDICARE

## 2023-12-12 VITALS
HEIGHT: 66 IN | OXYGEN SATURATION: 97 % | HEART RATE: 87 BPM | SYSTOLIC BLOOD PRESSURE: 129 MMHG | DIASTOLIC BLOOD PRESSURE: 68 MMHG | WEIGHT: 201 LBS | BODY MASS INDEX: 32.3 KG/M2

## 2023-12-12 DIAGNOSIS — I10 ESSENTIAL HYPERTENSION, BENIGN: Primary | ICD-10-CM

## 2023-12-12 DIAGNOSIS — E78.2 MIXED HYPERLIPIDEMIA: ICD-10-CM

## 2023-12-12 DIAGNOSIS — E11.29 TYPE 2 DIABETES MELLITUS WITH MICROALBUMINURIA, WITHOUT LONG-TERM CURRENT USE OF INSULIN: ICD-10-CM

## 2023-12-12 DIAGNOSIS — R80.9 TYPE 2 DIABETES MELLITUS WITH MICROALBUMINURIA, WITHOUT LONG-TERM CURRENT USE OF INSULIN: ICD-10-CM

## 2023-12-12 DIAGNOSIS — Z72.0 TOBACCO USE: ICD-10-CM

## 2023-12-12 PROCEDURE — 1160F RVW MEDS BY RX/DR IN RCRD: CPT

## 2023-12-12 PROCEDURE — 99213 OFFICE O/P EST LOW 20 MIN: CPT

## 2023-12-12 PROCEDURE — 1159F MED LIST DOCD IN RCRD: CPT

## 2023-12-12 PROCEDURE — 3074F SYST BP LT 130 MM HG: CPT

## 2023-12-12 PROCEDURE — 93000 ELECTROCARDIOGRAM COMPLETE: CPT

## 2023-12-12 PROCEDURE — 3078F DIAST BP <80 MM HG: CPT

## 2024-02-08 ENCOUNTER — TELEPHONE (OUTPATIENT)
Dept: FAMILY MEDICINE CLINIC | Facility: CLINIC | Age: 69
End: 2024-02-08
Payer: MEDICARE

## 2024-02-08 NOTE — TELEPHONE ENCOUNTER
Pt is interested in switching from Trulicity to Ozempic. He has about three pills left for Trulicity. Pt wanted to know what he would need to do in order to change mediations?    Also, he has been being seen at Dr. Garcias's office for his dry eyes. He feels like he needs another opinion. So he would like to explore his options for further eye care.

## 2024-02-09 NOTE — TELEPHONE ENCOUNTER
These medicines don't come in pills, they're only available as injectables. Ozempic works the same way as the trulicity, and trulicity will also help with weight loss if the dose is adjusted--but he had only been taking it every other week to save costs. If he takes the trulicity every week and we gradually adjust his dose up, it facilitates weight loss just like ozempic. It would be best to discuss this at his next appointment. Does he want a referral to an ophthalmologist?      Called pt will work on earlier appt

## 2024-02-12 ENCOUNTER — OFFICE VISIT (OUTPATIENT)
Dept: FAMILY MEDICINE CLINIC | Facility: CLINIC | Age: 69
End: 2024-02-12
Payer: MEDICARE

## 2024-02-12 VITALS
OXYGEN SATURATION: 94 % | SYSTOLIC BLOOD PRESSURE: 146 MMHG | TEMPERATURE: 97.7 F | WEIGHT: 205.6 LBS | DIASTOLIC BLOOD PRESSURE: 88 MMHG | HEIGHT: 66 IN | HEART RATE: 88 BPM | BODY MASS INDEX: 33.04 KG/M2

## 2024-02-12 DIAGNOSIS — R80.9 TYPE 2 DIABETES MELLITUS WITH MICROALBUMINURIA, WITHOUT LONG-TERM CURRENT USE OF INSULIN: Primary | ICD-10-CM

## 2024-02-12 DIAGNOSIS — H04.123 DRY EYES: ICD-10-CM

## 2024-02-12 DIAGNOSIS — E11.29 TYPE 2 DIABETES MELLITUS WITH MICROALBUMINURIA, WITHOUT LONG-TERM CURRENT USE OF INSULIN: Primary | ICD-10-CM

## 2024-02-12 PROCEDURE — 3079F DIAST BP 80-89 MM HG: CPT | Performed by: INTERNAL MEDICINE

## 2024-02-12 PROCEDURE — 3077F SYST BP >= 140 MM HG: CPT | Performed by: INTERNAL MEDICINE

## 2024-02-12 PROCEDURE — 99213 OFFICE O/P EST LOW 20 MIN: CPT | Performed by: INTERNAL MEDICINE

## 2024-02-12 RX ORDER — SEMAGLUTIDE 1.34 MG/ML
1 INJECTION, SOLUTION SUBCUTANEOUS WEEKLY
Qty: 3 ML | Refills: 1 | Status: SHIPPED | OUTPATIENT
Start: 2024-02-12 | End: 2024-02-14 | Stop reason: SDUPTHER

## 2024-02-12 NOTE — PROGRESS NOTES
Office Note     Name: Andrea Mancilla    : 1955     MRN: [id number]     Chief Complaint  Diabetes (Pt is here to follow up on diabetes today. He would like to discuss medications that would also help with weight loss. ) and Eye Problem (Pt complains of bilateral eye discomfort. States that right eye was worse. )    Subjective     History of Present Illness:  Andrea Mancilla is a 68 y.o. male who presents today for an appointment just ahead of his normal followup next month with Dr Vaca because he wants to change DM medications and did not want to wait until his appointment because he would like to switch to Ozempic de to improved weight loss reported with it as compared to Trulicity    Has been on Trulicity weekly for the past several months for his DM but has not been able to lose weight.  Previous notes stated he was taking it every other day .  Patient states he was doing well for a while, until a foot injury,  and his A1C got worse, so  he then  went to  taking it every week for the last several months.  Could not take metformin due to diarrhea and abdominal pain. A1C was 6.8 on the trulicity.    Would also like a new referral to a new eye doctor.  Has been seeing Dr Garcias at [facility].  Recently had a hair in the eyelid, when she went they did not see a foreign body so he went back the next week and they were able to find it.   Has also noticed his vision gets blurry at times but is mostly concerned about dry eyes        Review of Systems:   Review of Systems    Past Medical History:   Past Medical History:   Diagnosis Date    Colon cancer screening 2023    cologuard negative, repeat due 3yrs    Diabetes mellitus     Hyperlipidemia     Hypertension     Prostate cancer     s/p prostatectomy, successful       Past Surgical History:   Past Surgical History:   Procedure Laterality Date    BACK SURGERY      L4-L5    KNEE SURGERY Left     PROSTATE SURGERY      TOE SURGERY Right  "12/22/2022    Bunionectomy       Family History:   Family History   Problem Relation Age of Onset    Lung cancer Father        Social History:   Social History     Socioeconomic History    Marital status:    Tobacco Use    Smoking status: Some Days     Packs/day: 0.15     Years: 50.00     Additional pack years: 0.00     Total pack years: 7.50     Types: Cigarettes     Start date: 1969    Smokeless tobacco: Never   Vaping Use    Vaping Use: Never used   Substance and Sexual Activity    Alcohol use: Yes     Comment: OCC    Drug use: Yes     Comment: Smokes Marijuana    Sexual activity: Defer       Immunizations:   Immunization History   Administered Date(s) Administered    COVID-19 (PFIZER) Purple Cap Monovalent 03/01/2021, 03/13/2021    Fluzone High-Dose 65+yrs 12/21/2021, 10/18/2022, 10/17/2023    Pneumococcal Polysaccharide (PPSV23) 04/19/2022        Medications:     Current Outpatient Medications:     coenzyme Q10 100 MG capsule, Take 1 capsule by mouth Daily., Disp: , Rfl:     glucosamine-chondroitin 500-400 MG capsule capsule, Take  by mouth 3 (Three) Times a Day With Meals., Disp: , Rfl:     losartan (COZAAR) 25 MG tablet, Take 1 tablet by mouth Daily., Disp: 90 tablet, Rfl: 2    Omega-3 Krill Oil 300 MG capsule, Take  by mouth., Disp: , Rfl:     rosuvastatin (CRESTOR) 40 MG tablet, Take 1 tablet by mouth Every Night., Disp: 90 tablet, Rfl: 2    Semaglutide, 1 MG/DOSE, (Ozempic, 1 MG/DOSE,) 4 MG/3ML solution pen-injector, Inject 1 mg under the skin into the appropriate area as directed 1 (One) Time Per Week., Disp: 3 mL, Rfl: 1    Current Facility-Administered Medications:     technetium tetrofosmin (Tc-MYOVIEW) injection 1 dose, 1 dose, Intravenous, Once in imaging, Katelin Lopez, [place]    Allergies:   Allergies   Allergen Reactions    Metformin Diarrhea       Objective     Vital Signs  /88   Pulse 88   Temp 97.7 °F (36.5 °C) (Oral)   Ht 167.6 cm (66\")   Wt 93.3 kg (205 lb 9.6 oz)   " "SpO2 94%   BMI 33.18 kg/m²   Estimated body mass index is 33.18 kg/m² as calculated from the following:    Height as of this encounter: 167.6 cm (66\").    Weight as of this encounter: 93.3 kg (205 lb 9.6 oz).          Physical Exam  Vitals and nursing note reviewed.   Constitutional:       Appearance: Normal appearance.   Cardiovascular:      Rate and Rhythm: Normal rate and regular rhythm.      Heart sounds: No murmur heard.     No friction rub. No gallop.   Pulmonary:      Effort: Pulmonary effort is normal.      Breath sounds: Normal breath sounds. No wheezing, rhonchi or rales.   Neurological:      Mental Status: He is alert.         Procedures     Assessment and Plan     1. Type 2 diabetes mellitus with microalbuminuria, without long-term current use of insulin  START Semaglutide, 1 MG/DOSE, (Ozempic, 1 MG/DOSE,) 4 MG/3ML solution pen-injector           Inject 1 mg under the skin into the appropriate area as directed 1 (One) Time Per Week.   STOP Trulicity (patient prefers to switch due to perception of imporved weight loss with ozempic as compared to  Trulicity.    Has also previously failed metformin due to GI side effects.    - Ambulatory Referral to Ophthalmology    2. Dry eyes      - Ambulatory Referral to Ophthalmology       Follow Up  Return for Keep your next scheduled follow up with Dr Vaca NEXT MONTH to go over your medications.    Patient was advised to call the office or seek medical care if  any issues discussed during this visit worsen or persist or if new concerns arise        MD GUY [facility] PRIMARY [facility]ARE  [address] [place] [id number] [phone]  "

## 2024-02-14 RX ORDER — SEMAGLUTIDE 1.34 MG/ML
1 INJECTION, SOLUTION SUBCUTANEOUS WEEKLY
Qty: 3 ML | Refills: 1 | Status: SHIPPED | OUTPATIENT
Start: 2024-02-14

## 2024-03-25 ENCOUNTER — OFFICE VISIT (OUTPATIENT)
Dept: FAMILY MEDICINE CLINIC | Facility: CLINIC | Age: 69
End: 2024-03-25
Payer: MEDICARE

## 2024-03-25 VITALS
WEIGHT: 204.4 LBS | HEIGHT: 66 IN | BODY MASS INDEX: 32.85 KG/M2 | DIASTOLIC BLOOD PRESSURE: 80 MMHG | OXYGEN SATURATION: 97 % | HEART RATE: 87 BPM | SYSTOLIC BLOOD PRESSURE: 134 MMHG

## 2024-03-25 DIAGNOSIS — M19.041 PRIMARY OSTEOARTHRITIS OF HANDS, BILATERAL: ICD-10-CM

## 2024-03-25 DIAGNOSIS — M51.36 LUMBAR DEGENERATIVE DISC DISEASE: ICD-10-CM

## 2024-03-25 DIAGNOSIS — M19.042 PRIMARY OSTEOARTHRITIS OF HANDS, BILATERAL: ICD-10-CM

## 2024-03-25 DIAGNOSIS — F41.8 SITUATIONAL ANXIETY: ICD-10-CM

## 2024-03-25 DIAGNOSIS — Z85.46 PERSONAL HISTORY OF PROSTATE CANCER: ICD-10-CM

## 2024-03-25 DIAGNOSIS — E11.65 UNCONTROLLED TYPE 2 DIABETES MELLITUS WITH HYPERGLYCEMIA: ICD-10-CM

## 2024-03-25 DIAGNOSIS — M20.22 HALLUX RIGIDUS OF BOTH FEET: ICD-10-CM

## 2024-03-25 DIAGNOSIS — B35.1 ONYCHOMYCOSIS: ICD-10-CM

## 2024-03-25 DIAGNOSIS — R80.9 TYPE 2 DIABETES MELLITUS WITH MICROALBUMINURIA, WITHOUT LONG-TERM CURRENT USE OF INSULIN: ICD-10-CM

## 2024-03-25 DIAGNOSIS — H02.831 DERMATOCHALASIS OF BOTH UPPER EYELIDS: ICD-10-CM

## 2024-03-25 DIAGNOSIS — H02.834 DERMATOCHALASIS OF BOTH UPPER EYELIDS: ICD-10-CM

## 2024-03-25 DIAGNOSIS — M20.21 HALLUX RIGIDUS OF BOTH FEET: ICD-10-CM

## 2024-03-25 DIAGNOSIS — Z00.01 ENCOUNTER FOR GENERAL ADULT MEDICAL EXAMINATION WITH ABNORMAL FINDINGS: Primary | ICD-10-CM

## 2024-03-25 DIAGNOSIS — I10 ESSENTIAL HYPERTENSION, BENIGN: ICD-10-CM

## 2024-03-25 DIAGNOSIS — E78.2 MIXED HYPERLIPIDEMIA: ICD-10-CM

## 2024-03-25 DIAGNOSIS — Z79.899 ENCOUNTER FOR LONG-TERM (CURRENT) USE OF OTHER MEDICATIONS: ICD-10-CM

## 2024-03-25 DIAGNOSIS — E11.29 TYPE 2 DIABETES MELLITUS WITH MICROALBUMINURIA, WITHOUT LONG-TERM CURRENT USE OF INSULIN: ICD-10-CM

## 2024-03-25 LAB
EXPIRATION DATE: ABNORMAL
HBA1C MFR BLD: 8 % (ref 4.5–5.7)
Lab: ABNORMAL

## 2024-03-25 RX ORDER — SEMAGLUTIDE 1.34 MG/ML
1 INJECTION, SOLUTION SUBCUTANEOUS WEEKLY
Qty: 9 ML | Refills: 1 | Status: SHIPPED | OUTPATIENT
Start: 2024-03-25

## 2024-03-25 RX ORDER — LOSARTAN POTASSIUM 25 MG/1
25 TABLET ORAL DAILY
Qty: 90 TABLET | Refills: 2 | Status: SHIPPED | OUTPATIENT
Start: 2024-03-25

## 2024-03-25 RX ORDER — ROSUVASTATIN CALCIUM 40 MG/1
40 TABLET, COATED ORAL NIGHTLY
Qty: 90 TABLET | Refills: 2 | Status: SHIPPED | OUTPATIENT
Start: 2024-03-25

## 2024-03-25 NOTE — PROGRESS NOTES
The ABCs of the Annual Wellness Visit  Subsequent Medicare Wellness Visit    Subjective    Andrea Mancilla is a 68 y.o. male who presents for a Subsequent Medicare Wellness Visit.    The following portions of the patient's history were reviewed and   updated as appropriate: allergies, current medications, past family history, past medical history, past social history, past surgical history, and problem list.    Compared to one year ago, the patient feels his physical   health is worse.    Compared to one year ago, the patient feels his mental   health is the same.    Recent Hospitalizations:  He was not admitted to the hospital during the last year.       Current Medical Providers:  Patient Care Team:  Neo Vaca MD as PCP - General (Family Medicine)  Joby Mandujano MD as PCP - Family Medicine    Outpatient Medications Prior to Visit   Medication Sig Dispense Refill    coenzyme Q10 100 MG capsule Take 1 capsule by mouth Daily.      glucosamine-chondroitin 500-400 MG capsule capsule Take  by mouth 3 (Three) Times a Day With Meals.      Omega-3 Krill Oil 300 MG capsule Take  by mouth.      losartan (COZAAR) 25 MG tablet Take 1 tablet by mouth Daily. 90 tablet 2    rosuvastatin (CRESTOR) 40 MG tablet Take 1 tablet by mouth Every Night. 90 tablet 2    Semaglutide, 1 MG/DOSE, (Ozempic, 1 MG/DOSE,) 4 MG/3ML solution pen-injector Inject 1 mg under the skin into the appropriate area as directed 1 (One) Time Per Week. 3 mL 1     Facility-Administered Medications Prior to Visit   Medication Dose Route Frequency Provider Last Rate Last Admin    technetium tetrofosmin (Tc-MYOVIEW) injection 1 dose  1 dose Intravenous Once in imaging Katelni Lopez, [place]           No opioid medication identified on active medication list. I have reviewed chart for other potential  high risk medication/s and harmful drug interactions in the elderly.        Aspirin is not on active medication list.  Aspirin use is not  "indicated based on review of current medical condition/s. Risk of harm outweighs potential benefits.  .    Patient Active Problem List   Diagnosis    Type 2 diabetes mellitus with microalbuminuria, without long-term current use of insulin    Mixed hyperlipidemia    Personal history of prostate cancer    Encounter for long-term (current) use of other medications    Essential hypertension, benign    Primary osteoarthritis of hands, bilateral    Lumbar degenerative disc disease    Marijuana use, continuous    Hallux rigidus of both feet    Chest pain, atypical    Exertional dyspnea    Tobacco use    Combined form of senile cataract    Dermatochalasis of both upper eyelids    Onychomycosis    Situational anxiety     Advance Care Planning   Advance Care Planning     Advance Directive is not on file.  ACP discussion was held with the patient during this visit. Patient does not have an advance directive, information provided.     Objective    Vitals:    03/25/24 1113   BP: 134/80   BP Location: Left arm   Patient Position: Sitting   Cuff Size: Adult   Pulse: 87   SpO2: 97%   Weight: 92.7 kg (204 lb 6.4 oz)   Height: 167.6 cm (66\")     Estimated body mass index is 32.99 kg/m² as calculated from the following:    Height as of this encounter: 167.6 cm (66\").    Weight as of this encounter: 92.7 kg (204 lb 6.4 oz).           Does the patient have evidence of cognitive impairment? No    Lab Results   Component Value Date    HGBA1C 8.0 (A) 03/25/2024        HEALTH RISK ASSESSMENT    Smoking Status:  Social History     Tobacco Use   Smoking Status Some Days    Current packs/day: 0.15    Average packs/day: 0.2 packs/day for 55.2 years (8.3 ttl pk-yrs)    Types: Cigarettes    Start date: 1/1/1969   Smokeless Tobacco Never     Alcohol Consumption:  Social History     Substance and Sexual Activity   Alcohol Use Not Currently    Comment: OCC     Fall Risk Screen:    STEADI Fall Risk Assessment was completed, and patient is at LOW risk " for falls.Assessment completed on:3/25/2024    Depression Screening:      3/25/2024    11:21 AM   PHQ-2/PHQ-9 Depression Screening   Little Interest or Pleasure in Doing Things 0-->not at all   Feeling Down, Depressed or Hopeless 0-->not at all   PHQ-9: Brief Depression Severity Measure Score 0       Health Habits and Functional and Cognitive Screening:      3/25/2024    11:21 AM   Functional & Cognitive Status   Do you have difficulty preparing food and eating? No   Do you have difficulty bathing yourself, getting dressed or grooming yourself? No   Do you have difficulty using the toilet? No   Do you have difficulty moving around from place to place? No   Do you have trouble with steps or getting out of a bed or a chair? No   Current Diet Well Balanced Diet   Dental Exam Up to date   Eye Exam Up to date   Exercise (times per week) 6 times per week   Current Exercises Include Walking   Do you need help using the phone?  No   Are you deaf or do you have serious difficulty hearing?  No   Do you need help to go to places out of walking distance? No   Do you need help shopping? No   Do you need help preparing meals?  No   Do you need help with housework?  No   Do you need help with laundry? No   Do you need help taking your medications? No   Do you need help managing money? No   Do you ever drive or ride in a car without wearing a seat belt? No   Have you felt unusual stress, anger or loneliness in the last month? Yes   Who do you live with? Spouse   If you need help, do you have trouble finding someone available to you? No   Have you been bothered in the last four weeks by sexual problems? No   Do you have difficulty concentrating, remembering or making decisions? No       Age-appropriate Screening Schedule:  Refer to the list below for future screening recommendations based on patient's age, sex and/or medical conditions. Orders for these recommended tests are listed in the plan section. The patient has been provided  with a written plan.    Health Maintenance   Topic Date Due    TDAP/TD VACCINES (1 - Tdap) Never done    RSV Vaccine - Adults (1 - 1-dose 60+ series) Never done    ANNUAL WELLNESS VISIT  Never done    AAA SCREEN (ONE-TIME)  Never done    Pneumococcal Vaccine 65+ (2 of 2 - PCV) 04/19/2023    COVID-19 Vaccine (5 - 2023-24 season) 09/01/2023    ZOSTER VACCINE (1 of 2) 04/17/2024 (Originally 5/12/2006)    DIABETIC FOOT EXAM  04/17/2024    URINE MICROALBUMIN  04/17/2024    DIABETIC EYE EXAM  07/17/2024    HEMOGLOBIN A1C  09/25/2024    LIPID PANEL  10/17/2024    BMI FOLLOWUP  03/25/2025    COLORECTAL CANCER SCREENING  05/10/2026    HEPATITIS C SCREENING  Completed    INFLUENZA VACCINE  Completed                  CMS Preventative Services Quick Reference  Risk Factors Identified During Encounter  None Identified  The above risks/problems have been discussed with the patient.  Pertinent information has been shared with the patient in the After Visit Summary.  An After Visit Summary and PPPS were made available to the patient.    Follow Up:   Next Medicare Wellness visit to be scheduled in 1 year.       Additional E&M Note during same encounter follows:  Patient has multiple medical problems which are significant and separately identifiable that require additional work above and beyond the Medicare Wellness Visit.      Chief Complaint  medicare wellness subsquent  and Annual Exam    Subjective        HPI  Andrea Mancilla is also being seen today for annual exam and checkup on diabetes.  The patient came to see a colleague of mine about a month ago because he had been reading that Ozempic had favorable weight loss profile compared to Trulicity, and wished to switch.  As it turns out, about that same time Trulicity became unavailable nationwide due to supply chain issues, so it was a good thing that he was switched.  However, he had some Trulicity left so he just took his first dose of Ozempic yesterday.  He has not been  checking his glucose very often, as he has been somewhat preoccupied with caring for his wife, who has been battling cancer for the last several years, and in and out of the hospital.  The patient also has been having a lot of trouble with irritation and dry issues.  He has been to 2 different ophthalmologist and tried multiple treatments but he continues to have irritation and redness of the eyes.  However, he was just seen by the second ophthalmologist a few days ago and was started on doxycycline 100 mg daily.    Review of Systems   Constitutional:  Negative for activity change, appetite change, chills and fever.   HENT:  Negative for congestion, sore throat and trouble swallowing.    Eyes:  Positive for redness and itching. Negative for visual disturbance.   Respiratory:  Negative for cough, choking, chest tightness, shortness of breath and wheezing.    Cardiovascular:  Negative for chest pain, palpitations and leg swelling.   Gastrointestinal:  Negative for abdominal pain, blood in stool, constipation, diarrhea, nausea and vomiting.   Endocrine: Negative for polydipsia, polyphagia and polyuria.   Genitourinary:  Negative for decreased urine volume, dysuria and hematuria.   Musculoskeletal:  Positive for arthralgias and back pain. Negative for gait problem, joint swelling, myalgias and neck pain.   Skin:  Positive for rash (Does have some dry itchy skin on his hands). Negative for wound.   Allergic/Immunologic: Positive for environmental allergies.   Neurological:  Negative for dizziness, tremors, seizures, syncope, facial asymmetry, speech difficulty, weakness and light-headedness.   Hematological:  Negative for adenopathy.   Psychiatric/Behavioral:  Positive for sleep disturbance. Negative for dysphoric mood, self-injury and suicidal ideas. The patient is nervous/anxious (Situational anxiety due to his wife's illness).        Objective   Vital Signs:  /80 (BP Location: Left arm, Patient Position:  "Sitting, Cuff Size: Adult)   Pulse 87   Ht 167.6 cm (66\")   Wt 92.7 kg (204 lb 6.4 oz)   SpO2 97%   BMI 32.99 kg/m²     Physical Exam  Constitutional:       General: He is not in acute distress.     Appearance: He is obese. He is not ill-appearing, toxic-appearing or diaphoretic.   HENT:      Head: Normocephalic and atraumatic.      Right Ear: Ear canal and external ear normal.      Left Ear: Ear canal and external ear normal.      Nose: Nose normal.      Mouth/Throat:      Mouth: Mucous membranes are moist.      Pharynx: Oropharynx is clear.   Eyes:      General: No scleral icterus.     Extraocular Movements: Extraocular movements intact.      Right eye: Normal extraocular motion and no nystagmus.      Left eye: Normal extraocular motion and no nystagmus.      Conjunctiva/sclera:      Right eye: Right conjunctiva is injected. No hemorrhage.     Left eye: Left conjunctiva is injected. No hemorrhage.     Pupils: Pupils are equal, round, and reactive to light.      Comments: Mild redness of the upper eyelids and conjunctival bilaterally   Neck:      Vascular: No carotid bruit.   Cardiovascular:      Rate and Rhythm: Normal rate and regular rhythm.      Pulses: Normal pulses.           Dorsalis pedis pulses are 2+ on the right side and 2+ on the left side.        Posterior tibial pulses are 2+ on the right side and 2+ on the left side.      Heart sounds: Normal heart sounds. No murmur heard.     No gallop.   Pulmonary:      Effort: Pulmonary effort is normal.      Breath sounds: Normal breath sounds. No wheezing, rhonchi or rales.   Chest:      Chest wall: No tenderness.   Abdominal:      General: Bowel sounds are normal. There is no distension.      Palpations: Abdomen is soft. There is no mass.      Tenderness: There is no abdominal tenderness. There is no guarding or rebound.   Musculoskeletal:         General: No swelling or deformity. Normal range of motion.      Cervical back: No rigidity.      Right lower " leg: No edema.      Left lower leg: No edema.      Right foot: Deformity (Hallux rigidus) present.      Left foot: Deformity (Hallux rigidus) present.   Feet:      Right foot:      Protective Sensation: 5 sites tested.  5 sites sensed.      Skin integrity: Skin integrity normal.      Toenail Condition: Fungal disease present.     Left foot:      Protective Sensation: 5 sites tested.  5 sites sensed.      Skin integrity: Skin integrity normal.      Toenail Condition: Fungal disease present.     Comments:   Onychomycosis of the nails has improved, but still present to a mild degree  Lymphadenopathy:      Cervical: No cervical adenopathy.   Skin:     General: Skin is warm and dry.      Capillary Refill: Capillary refill takes less than 2 seconds.      Coloration: Skin is not jaundiced or pale.      Findings: Rash (Scattered patches of dry skin on palms) present. No bruising or erythema.   Neurological:      General: No focal deficit present.      Mental Status: He is alert and oriented to person, place, and time.      Cranial Nerves: No cranial nerve deficit.      Motor: No weakness.      Coordination: Coordination normal.      Gait: Gait normal.   Psychiatric:         Mood and Affect: Mood normal.         Behavior: Behavior normal.         Thought Content: Thought content normal.         Judgment: Judgment normal.                         Assessment and Plan   Diagnoses and all orders for this visit:    1. Encounter for general adult medical examination with abnormal findings (Primary)  Healthy lifestyle measures including healthy diet regular exercise and weight reduction were discussed.  Preventive healthcare measures were also discussed.  Vaccines recommended that he will need to get the pharmacy were all recommended and pointed out.  Patient was not due for a full laboratory panel today, so we just checked his A1c but it was elevated at 8.  He just took his first dose of Ozempic yesterday, so hopefully this will  improve, and he agreed to work on following his diet and getting more exercise, but he is somewhat limited by musculoskeletal issues and the need to care for his wife.  The patient will return in 3 months for a fingerstick A1c, and in 6 months for his next appointment  2. Type 2 diabetes mellitus with microalbuminuria, without long-term current use of insulin  -     POC Glycosylated Hemoglobin (Hb A1C)  Poorly controlled at this time, as above, cannot tolerate metformin at any dose or in any formulation.  He just switched to Ozempic yesterday so we will see how things go over the next 3 months  3. Uncontrolled type 2 diabetes mellitus with hyperglycemia  See above  4. Mixed hyperlipidemia  -     rosuvastatin (CRESTOR) 40 MG tablet; Take 1 tablet by mouth Every Night.  Dispense: 90 tablet; Refill: 2    5. Encounter for long-term (current) use of other medications    6. Essential hypertension, benign    7. Personal history of prostate cancer  Fortunately has not had any evidence of recurrence  8. Dermatochalasis of both upper eyelids  Followed by ophthalmology  9. Primary osteoarthritis of hands, bilateral    10. Lumbar degenerative disc disease    11. Hallux rigidus of both feet  Patient has been seeing podiatry  12. Onychomycosis  Patient says podiatry prescribed ketoconazole cream, and surprisingly his toenails look better, although I did explain that this rarely clears things up completely, although I am very pleased with the progress  13. Situational anxiety  Patient thinks he is coping adequately with his stressors, I does not think that he needs any counseling or medication at this time.  He will let me know if this changes  Other orders  -     losartan (COZAAR) 25 MG tablet; Take 1 tablet by mouth Daily.  Dispense: 90 tablet; Refill: 2  -     Semaglutide, 1 MG/DOSE, (Ozempic, 1 MG/DOSE,) 4 MG/3ML solution pen-injector; Inject 1 mg under the skin into the appropriate area as directed 1 (One) Time Per Week.   Dispense: 9 mL; Refill: 1             Follow Up   Return in about 6 months (around 9/25/2024) for Recheck.  Patient was given instructions and counseling regarding his condition or for health maintenance advice. Please see specific information pulled into the AVS if appropriate.

## 2024-03-28 ENCOUNTER — TELEPHONE (OUTPATIENT)
Dept: FAMILY MEDICINE CLINIC | Facility: CLINIC | Age: 69
End: 2024-03-28

## 2024-03-28 NOTE — TELEPHONE ENCOUNTER
Caller: Andrea Mancilla    Relationship: Self    Best call back number:  [phone]     Who are you requesting to speak with (clinical staff, provider,  specific staff member): DR FONTAINE/CLINICAL    What was the call regarding: PATIENT ADVISED HE HAD DIARRHEA AND ACID REFLUX FOR 3 DAYS AFTER TAKING OZEMPIC FOR FIRST TIME.  HE STATED HE  WENT TO [employer] AN EMPLOY[facility] THERE ADVISED HIM NOT TO INJECT INTO HIS BELLY BUT INSTEAD INTO HIS LEG OR SOMEWHERE ELSE WITH FAT. HE STATED HE DID AND IT WORKS MUCH BETTER NOW WITHOUT THOSE SIDE EFFECTS.  PATIENT WOULD LIKE NURSE TO RETURN HIS CALL TO DISCUSS THIS.  THANK YOU

## 2024-03-29 NOTE — TELEPHONE ENCOUNTER
Spoke with patient.  He said that he was having some reflux, hiccups, and vomiting after taking his first injection.  Pt stated that he was advised to take his shot in his thigh and not in his abdomen. He was told to take Omeprazole daily.  He is now taking it daily.   Pt stated that he just called to have us tell everyone to give it in their thighs.

## 2024-04-05 DIAGNOSIS — E78.2 MIXED HYPERLIPIDEMIA: ICD-10-CM

## 2024-04-05 NOTE — TELEPHONE ENCOUNTER
Caller: Laurent Andrea Felipe    Relationship: Self    Best call back number: [phone]     Requested Prescriptions:   Requested Prescriptions     Pending Prescriptions Disp Refills    rosuvastatin (CRESTOR) 40 MG tablet 90 tablet 2     Sig: Take 1 tablet by mouth Every Night.      Pharmacy where request should be sent:  PATIENT STATES HE IS HAVING TROUBLE WITH [facility] AND HE IS OUT OF MEDICATION.     Last office visit with prescribing clinician: 3/25/2024   Last telemedicine visit with prescribing clinician: Visit date not found   Next office visit with prescribing clinician: 9/26/2024     Does the patient have less than a 3 day supply:  [x] Yes  [] No    PATIENT STATES IF ITS EASIER, PLEASE SEND OVER A SUPPLY TO THE [facility].     [facility] [id number] - [place] [address] - [address] - [phone]  - [phone]  [phone]     Carlos A Michaels Rep   04/05/24 15:54 EDT

## 2024-04-08 RX ORDER — ROSUVASTATIN CALCIUM 40 MG/1
40 TABLET, COATED ORAL NIGHTLY
Qty: 90 TABLET | Refills: 2 | OUTPATIENT
Start: 2024-04-08

## 2024-04-08 RX ORDER — SEMAGLUTIDE 1.34 MG/ML
INJECTION, SOLUTION SUBCUTANEOUS
Qty: 3 ML | Refills: 1 | OUTPATIENT
Start: 2024-04-08

## 2024-05-07 RX ORDER — SEMAGLUTIDE 1.34 MG/ML
1 INJECTION, SOLUTION SUBCUTANEOUS WEEKLY
Qty: 9 ML | Refills: 1 | Status: SHIPPED | OUTPATIENT
Start: 2024-05-07

## 2024-05-14 NOTE — TELEPHONE ENCOUNTER
Caller: Andrea Mancilla    Relationship: Self    Best call back number: [phone]     Requested Prescriptions:   Requested Prescriptions     Pending Prescriptions Disp Refills    Semaglutide, 1 MG/DOSE, (Ozempic, 1 MG/DOSE,) 4 MG/3ML solution pen-injector 9 mL 1     Sig: Inject 1 mg under the skin into the appropriate area as directed 1 (One) Time Per Week.    NOTE- 90 DAYS SUPPLY    Pharmacy where request should be sent:  [facility] [address] [place] [address] [phone] [facility] [phone] FX     Last office visit with prescribing clinician: 3/25/2024   Last telemedicine visit with prescribing clinician: Visit date not found   Next office visit with prescribing clinician: 9/26/2024     Additional details provided by patient: PLEASE SEND 90 DAY SUPPLY WITH REFILLS. PATIENT WILL NOT HAVE HIS DOSE FOR SUNDAY.    Does the patient have less than a 3 day supply:  [x] Yes  [] No    Would you like a call back once the refill request has been completed: [] Yes [x] No    If the office needs to give you a call back, can they leave a voicemail: [x] Yes [] No    Carlos A Coleman Rep   05/14/24 15:27 EDT

## 2024-05-15 RX ORDER — SEMAGLUTIDE 1.34 MG/ML
1 INJECTION, SOLUTION SUBCUTANEOUS WEEKLY
Qty: 9 ML | Refills: 1 | OUTPATIENT
Start: 2024-05-15

## 2024-05-28 ENCOUNTER — TELEPHONE (OUTPATIENT)
Dept: FAMILY MEDICINE CLINIC | Facility: CLINIC | Age: 69
End: 2024-05-28
Payer: MEDICARE

## 2024-05-28 RX ORDER — SEMAGLUTIDE 1.34 MG/ML
1 INJECTION, SOLUTION SUBCUTANEOUS WEEKLY
Qty: 9 ML | Refills: 0 | Status: SHIPPED | OUTPATIENT
Start: 2024-05-28

## 2024-05-28 NOTE — TELEPHONE ENCOUNTER
Pt needs prescription for Ozempic sent to [facility] in [place], [place].     He was told there was a PA sent in for it and Dr. Mandujano denied it. He would like Dr. Vaca to take care of getting this prescription to him.

## 2024-08-23 DIAGNOSIS — E78.2 MIXED HYPERLIPIDEMIA: ICD-10-CM

## 2024-08-23 RX ORDER — ROSUVASTATIN CALCIUM 40 MG/1
40 TABLET, COATED ORAL NIGHTLY
Qty: 90 TABLET | Refills: 1 | OUTPATIENT
Start: 2024-08-23

## 2024-08-23 RX ORDER — LOSARTAN POTASSIUM 25 MG/1
25 TABLET ORAL DAILY
Qty: 90 TABLET | Refills: 1 | OUTPATIENT
Start: 2024-08-23